# Patient Record
Sex: MALE | Race: WHITE
[De-identification: names, ages, dates, MRNs, and addresses within clinical notes are randomized per-mention and may not be internally consistent; named-entity substitution may affect disease eponyms.]

---

## 2018-06-24 ENCOUNTER — HOSPITAL ENCOUNTER (INPATIENT)
Dept: HOSPITAL 47 - EC | Age: 58
LOS: 4 days | Discharge: HOME | DRG: 493 | End: 2018-06-28
Payer: MEDICARE

## 2018-06-24 VITALS — BODY MASS INDEX: 29.1 KG/M2

## 2018-06-24 DIAGNOSIS — D72.829: ICD-10-CM

## 2018-06-24 DIAGNOSIS — K21.9: ICD-10-CM

## 2018-06-24 DIAGNOSIS — E07.81: ICD-10-CM
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
## 2018-06-24 DIAGNOSIS — S82.401A: ICD-10-CM

## 2018-06-24 DIAGNOSIS — I47.2: ICD-10-CM

## 2018-06-24 DIAGNOSIS — M19.90: ICD-10-CM
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
## 2018-06-24 DIAGNOSIS — R11.0: ICD-10-CM

## 2018-06-24 DIAGNOSIS — I35.8: ICD-10-CM

## 2018-06-24 DIAGNOSIS — I49.3: ICD-10-CM

## 2018-06-24 DIAGNOSIS — G90.512: ICD-10-CM

## 2018-06-24 DIAGNOSIS — Z96.60: ICD-10-CM

## 2018-06-24 DIAGNOSIS — K44.9: ICD-10-CM

## 2018-06-24 DIAGNOSIS — Z79.899: ICD-10-CM

## 2018-06-24 DIAGNOSIS — Z88.0: ICD-10-CM

## 2018-06-24 DIAGNOSIS — S82.201A: Primary | ICD-10-CM

## 2018-06-24 DIAGNOSIS — R00.8: ICD-10-CM

## 2018-06-24 LAB
ALBUMIN SERPL-MCNC: 4.2 G/DL (ref 3.5–5)
ALP SERPL-CCNC: 57 U/L (ref 38–126)
ALT SERPL-CCNC: 42 U/L (ref 21–72)
ANION GAP SERPL CALC-SCNC: 11 MMOL/L
AST SERPL-CCNC: 33 U/L (ref 17–59)
BASOPHILS # BLD AUTO: 0.1 K/UL (ref 0–0.2)
BASOPHILS NFR BLD AUTO: 0 %
BILIRUB INDIRECT SERPL-MCNC: 0.6 MG/DL (ref 0–1.1)
BILIRUBIN DIRECT+TOT PNL SERPL-MCNC: 0.1 MG/DL (ref 0–0.2)
BUN SERPL-SCNC: 15 MG/DL (ref 9–20)
CALCIUM SPEC-MCNC: 9.1 MG/DL (ref 8.4–10.2)
CHLORIDE SERPL-SCNC: 102 MMOL/L (ref 98–107)
CO2 SERPL-SCNC: 27 MMOL/L (ref 22–30)
EOSINOPHIL # BLD AUTO: 0.1 K/UL (ref 0–0.7)
EOSINOPHIL NFR BLD AUTO: 0 %
ERYTHROCYTE [DISTWIDTH] IN BLOOD BY AUTOMATED COUNT: 5.44 M/UL (ref 4.3–5.9)
ERYTHROCYTE [DISTWIDTH] IN BLOOD: 12.8 % (ref 11.5–15.5)
GLUCOSE SERPL-MCNC: 104 MG/DL (ref 74–99)
HCT VFR BLD AUTO: 47.4 % (ref 39–53)
HGB BLD-MCNC: 15.6 GM/DL (ref 13–17.5)
INR PPP: 1.1 (ref ?–1.2)
LYMPHOCYTES # SPEC AUTO: 1.3 K/UL (ref 1–4.8)
LYMPHOCYTES NFR SPEC AUTO: 9 %
MAGNESIUM SPEC-SCNC: 2 MG/DL (ref 1.6–2.3)
MCH RBC QN AUTO: 28.7 PG (ref 25–35)
MCHC RBC AUTO-ENTMCNC: 32.9 G/DL (ref 31–37)
MCV RBC AUTO: 87.1 FL (ref 80–100)
MONOCYTES # BLD AUTO: 0.9 K/UL (ref 0–1)
MONOCYTES NFR BLD AUTO: 6 %
NEUTROPHILS # BLD AUTO: 13 K/UL (ref 1.3–7.7)
NEUTROPHILS NFR BLD AUTO: 84 %
PH UR: 5.5 [PH] (ref 5–8)
PLATELET # BLD AUTO: 271 K/UL (ref 150–450)
POTASSIUM SERPL-SCNC: 5.1 MMOL/L (ref 3.5–5.1)
PROT SERPL-MCNC: 6.6 G/DL (ref 6.3–8.2)
PT BLD: 10.3 SEC (ref 9–12)
SODIUM SERPL-SCNC: 140 MMOL/L (ref 137–145)
SP GR UR: 1.02 (ref 1–1.03)
UROBILINOGEN UR QL STRIP: <2 MG/DL (ref ?–2)
WBC # BLD AUTO: 15.5 K/UL (ref 3.8–10.6)

## 2018-06-24 PROCEDURE — 29505 APPLICATION LONG LEG SPLINT: CPT

## 2018-06-24 PROCEDURE — 71045 X-RAY EXAM CHEST 1 VIEW: CPT

## 2018-06-24 PROCEDURE — 80048 BASIC METABOLIC PNL TOTAL CA: CPT

## 2018-06-24 PROCEDURE — 85025 COMPLETE CBC W/AUTO DIFF WBC: CPT

## 2018-06-24 PROCEDURE — 85610 PROTHROMBIN TIME: CPT

## 2018-06-24 PROCEDURE — 84439 ASSAY OF FREE THYROXINE: CPT

## 2018-06-24 PROCEDURE — 93306 TTE W/DOPPLER COMPLETE: CPT

## 2018-06-24 PROCEDURE — 84481 FREE ASSAY (FT-3): CPT

## 2018-06-24 PROCEDURE — 83735 ASSAY OF MAGNESIUM: CPT

## 2018-06-24 PROCEDURE — 80076 HEPATIC FUNCTION PANEL: CPT

## 2018-06-24 PROCEDURE — 84484 ASSAY OF TROPONIN QUANT: CPT

## 2018-06-24 PROCEDURE — 36415 COLL VENOUS BLD VENIPUNCTURE: CPT

## 2018-06-24 PROCEDURE — 96374 THER/PROPH/DIAG INJ IV PUSH: CPT

## 2018-06-24 PROCEDURE — 84443 ASSAY THYROID STIM HORMONE: CPT

## 2018-06-24 PROCEDURE — 99285 EMERGENCY DEPT VISIT HI MDM: CPT

## 2018-06-24 PROCEDURE — 96376 TX/PRO/DX INJ SAME DRUG ADON: CPT

## 2018-06-24 PROCEDURE — 81003 URINALYSIS AUTO W/O SCOPE: CPT

## 2018-06-24 RX ADMIN — HYDROCODONE BITARTRATE AND ACETAMINOPHEN PRN EACH: 5; 325 TABLET ORAL at 22:18

## 2018-06-24 RX ADMIN — HYDROCODONE BITARTRATE AND ACETAMINOPHEN PRN EACH: 5; 325 TABLET ORAL at 18:17

## 2018-06-24 RX ADMIN — HYDROMORPHONE HYDROCHLORIDE PRN MG: 1 INJECTION, SOLUTION INTRAMUSCULAR; INTRAVENOUS; SUBCUTANEOUS at 23:37

## 2018-06-24 RX ADMIN — HYDROMORPHONE HYDROCHLORIDE PRN MG: 1 INJECTION, SOLUTION INTRAMUSCULAR; INTRAVENOUS; SUBCUTANEOUS at 20:27

## 2018-06-24 RX ADMIN — METOPROLOL TARTRATE SCH MG: 25 TABLET, FILM COATED ORAL at 20:28

## 2018-06-24 RX ADMIN — HEPARIN SODIUM SCH UNIT: 5000 INJECTION, SOLUTION INTRAVENOUS; SUBCUTANEOUS at 20:28

## 2018-06-24 RX ADMIN — THERA TABS SCH EACH: TAB at 20:28

## 2018-06-24 NOTE — ED
General Adult HPI





- General


Chief complaint: Extremity Injury, Lower


Stated complaint: Fx rt leg


Time Seen by Provider: 06/24/18 11:38


Mode of arrival: EMS


Limitations: no limitations





- History of Present Illness


Initial comments: 


Patient presents for right lower leg injury.  Patient states he was helping a 

friend lift a box off a truck, when a box fell on his right lower leg.  He 

complains of pain in the medial right shin.  Patient brought in by EMS, given 

morphine prior to arrival, states he still has moderate amount of pain.  

Patient denies prior injury to the leg.  Patient states he sees orthopedic 

surgeon Dr Quintero from [facility] in past of L shoulder injury.  Patient 

denies pain or injury to any other area of the body.  Denies head trauma or 

loss of consciousness.  Pt denies numbness or weakness.








- Related Data


 Allergies











Allergy/AdvReac Type Severity Reaction Status Date / Time


 


Penicillins Allergy  Unknown Verified 06/24/18 11:41














Review of Systems


ROS Statement: 


Those systems with pertinent positive or pertinent negative responses have been 

documented in the HPI.





ROS Other: All systems not noted in ROS Statement are negative.


Constitutional: Denies: fever, chills


Eyes: Denies: eye pain


ENT: Denies: epistaxis


Respiratory: Denies: dyspnea


Cardiovascular: Denies: chest pain, palpitations


Endocrine: Denies: fatigue


Gastrointestinal: Denies: abdominal pain, nausea, vomiting


Genitourinary: Reports: other (denies flank pain)


Musculoskeletal: Reports: joint swelling, arthralgia.  Denies: back pain, 

myalgia


Skin: Denies: change in color


Neurological: Denies: weakness, numbness


Hematological/Lymphatic: Denies: easy bleeding





General Exam





- General Exam Comments


Initial Comments: 


Sitting up in bed.  No acute distress at rest.  Conversing normally.  Calm, 

pleasant.





Limitations: no limitations


General appearance: alert, in no apparent distress


Head exam: Present: atraumatic, normocephalic, normal inspection


Eye exam: Present: normal appearance, PERRL, EOMI


ENT exam: Present: normal exam, mucous membranes moist


Neck exam: Present: normal inspection.  Absent: tenderness


Respiratory exam: Present: normal lung sounds bilaterally.  Absent: respiratory 

distress, wheezes, rales


Cardiovascular Exam: Present: regular rate, normal rhythm


GI/Abdominal exam: Present: soft.  Absent: distended, tenderness, guarding, 

rebound


Extremities exam: Present: tenderness, normal capillary refill, other (

Deformity and edema mid right lower leg.  DP pulses +2 out of 4 right lower 

extremity.  Right lower shimmy neurovascularly intact.).  Absent: pedal edema


Back exam: Present: normal inspection.  Absent: tenderness


Neurological exam: Present: alert, oriented X3, other (No focal neuro deficits 

on exam.)


Psychiatric exam: Present: normal affect, normal mood


Skin exam: Present: warm, dry, intact, normal color





Course


 Vital Signs











  06/24/18 06/24/18 06/24/18





  11:38 12:06 13:06


 


Temperature 97.3 F L  


 


Pulse Rate 47 L 89 95


 


Respiratory 18 16 16





Rate   


 


Blood Pressure 133/65 147/80 137/72


 


O2 Sat by Pulse 100 98 96





Oximetry   














  06/24/18 06/24/18





  13:49 14:00


 


Temperature 98.3 F 


 


Pulse Rate 93 91


 


Respiratory 16 16





Rate  


 


Blood Pressure 140/77 134/67


 


O2 Sat by Pulse 97 97





Oximetry  














Medical Decision Making





- Medical Decision Making


Patient nothing by mouth, IV Dilaudid given for pain.





X-ray shows transverse fractures mid-diaphyseal tibia and fibula with lateral 

angulation of the distal fracture segments





13:01 Ortho surg team paged. 





13:44 Spoke with ortho team, will review xrays and call back with 

recommendations





14:20 spoke with orthopedic surgery team, request admission to Dr. Paez, plan 

for surgery tomorrow.  States he'll place preop orders.  Requests patient be 

placed in posterior mold splint, no reduction necessary at this time.  





Patient placed in a posterior mold splint, tolerated procedure well.  

Neurovascular intact following procedure.





We'll admit to inpatient this time.  Patient updated without results and plan.





Disposition


Clinical Impression: 


 Tibia/fibula fracture, shaft





Disposition: ADMITTED AS IP TO THIS HOSP


Referrals: 


Santiago Beatty MD [Primary Care Provider] - 1-2 days

## 2018-06-24 NOTE — XR
EXAMINATION TYPE: XR chest 1V portable

 

DATE OF EXAM: 6/24/2018

 

COMPARISON: NONE

 

INDICATION: Surgical clearance

 

TECHNIQUE: Single frontal view of the chest is obtained.

 

FINDINGS:  

The heart size is normal.  

The pulmonary vasculature is normal.  

The lungs are clear.  

There is elevation of the right diaphragm.

 

IMPRESSION:  

1. No acute pulmonary process.

## 2018-06-24 NOTE — P.HPOR
History of Present Illness


H&P Date: 06/24/18


Chief Complaint: Right leg pain and deformity





Patient is a very pleasant 57-year-old retired male who retired from the [employer] department in 2010.  He is generally a relatively healthy and active person.

  He says he was helping his friend move truck bed on a trailer when the bed 

slipped and he tried to move his foot around to help prevent but the large 

heavy boxes fell onto his leg and twisted his leg and caused the break.  He 

says he hurt the brake and saw the deformity at his lower leg and the rotation 

of his foot malaligned with the rest of his leg.


He denies any prior problems with his right leg.  He is normally community 

without any assistance.  He does have history of left shoulder issues and 

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 

Cymbalta.  He does have issues of his left upper extremity but is able to go 

about his normal  activities at home but is unable to work.  He was at home 

alone.  


She denies prior problems with his left lower extremity.  Denies any prior 

problems with his general ambulatory status.  He is normally independent.





Review of Systems





as per HPI.  He says he has significant pain in his right lower leg especially 

with any movement.  He denies any numbness tingl his lower extremity.  He does 

not have any pain at his neck and back.  He does have a history of fractured 

his cervical spine in the past.     He is able to flex plantar flex.  He has no 

other pains at his other leg or upper extremity or his neck or back.





Past Medical History


Past Medical History: Musculoskeletal Disorder (Left shoulder impingement, 

history of cervical spine injury stable)


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


History of Any Multi-Drug Resistant Organisms: None Reported


Past Surgical History: Joint Replacement, Orthopedic Surgery


Additional Past Surgical History / Comment(s): Neck fx repair


Past Psychological History: No Psychological Hx Reported


Smoking Status: Never smoker


Past Alcohol Use History: None Reported


Past Drug Use History: None Reported





- Past Family History


  ** Father


Family Medical History: No Reported History





  ** Mother


Family Medical History: Unable to Obtain





Medications and Allergies


 Home Medications











 Medication  Instructions  Recorded  Confirmed  Type


 


DULoxetine HCL [Cymbalta] 60 mg PO DAILY 06/24/18 06/24/18 History


 


Multivitamins, Thera [Multivitamin 1 tab PO HS 06/24/18 06/24/18 History





(formulary)]    


 


Omega-3 Fatty Acids/Fish Oil [Fish 1 cap PO HS 06/24/18 06/24/18 History





Oil 1,000 mg Softgel]    


 


Omeprazole Magnesium [PriLOSEC OTC] 20 mg PO DAILY 06/24/18 06/24/18 History











 Allergies











Allergy/AdvReac Type Severity Reaction Status Date / Time


 


Penicillins Allergy  Unknown Verified 06/24/18 14:38














Physical Examination


Osteopathic Statement: *.  No significant issues noted on an osteopathic 

structural exam other than those noted in the History and Physical/Consult.





- Ankle & Foot


  ** left


Ankle appearance: other (He is some malrotation his right foot relation to his 

knee.  There is tenderness to palpation over his mid tibia.  His compartments 

are soft.  He has sustained a new dorsal flexion plantarflexion and EHL intact.

  Though this does give him pain.  His pulses are 2 over 4distally.  His 

compartments are soft.  He is nontender over his thigh.  His left lower 

extremity full active and passive range motion.  He is in a right long leg 

splint.  His upper extremity is have good active and passive range motion.  His 

neck is nontender to palpation range motion.)


Foot appearance: other (There is no open wound laceration or bleeding.  The 

skin is intact.)





Results





- Labs


Labs: 


 Abnormal Lab Results - Last 24 Hours (Table)











  06/24/18 06/24/18 Range/Units





  15:30 15:30 


 


WBC   15.5 H  (3.8-10.6)  k/uL


 


Neutrophils #   13.0 H  (1.3-7.7)  k/uL


 


Glucose  104 H   (74-99)  mg/dL








 H & H











  06/24/18 Range/Units





  15:30 


 


Hgb  15.6  (13.0-17.5)  gm/dL


 


Hct  47.4  (39.0-53.0)  %








 Coagulation











  06/24/18 Range/Units





  15:30 


 


INR  1.1  (<1.2)  











Result Diagrams: 


 06/24/18 15:30





 06/24/18 15:30





- Diagnostic results


Knee x-ray: report reviewed, image reviewed (X-rays are reviewed of his right 

tibia and fibula.  He has a short obliquefracture at the junction of middle and 

distal third of his right tibia and fibula.  There is displacement and some 

shortening.)





Assessment and Plan


Assessment: 


 acute right tibia and fibula fracture, acute and traumatic 


 closed neurovascularly intact tibia and fibula shaft fracture 





Plan: 


 the patient has a acute traumatic closed tibia and fibula shaft fracture.  





 the fracture is neurovascularly intact.  There is displacement and shortening 

at the fracture siteAnd I think they have the best treatment for him is 

surgical intervention with intramedullary tibial rodding.  I discussed the 

nature of his injury with him.  Discussed the risk of the injury and the risk 

of the treatment options.  We discussed the risk of bleeding risk for infection 

risk and need for further surgery risk of decreased loss of motion loss of 

function malunion nonunion hardware failure nerve damage, anesthesia 

possibility of blood clots pulmonary embolism and even death was explained to 

him the patient understands his risks.  I answered his questions best my 

ability and he elected to proceed with surgical intervention.





  We'll make him nothing by mouth after midnight, obtain surgical clearance and 

plan for surgical intramedullary IM rodding of his right tibia tomorrow 

afternoon.

## 2018-06-24 NOTE — XR
EXAMINATION TYPE: XR tibia fibula RT

 

DATE OF EXAM: 6/24/2018

 

COMPARISON: NONE

 

HISTORY: Pain

 

TECHNIQUE: 2 view right tibia and fibula

 

FINDINGS: There are transverse fractures in the mid to distal diaphyseal tibia and fibula. There is l
ateral angulation of distal fracture fragments. Some bayonet overlap is evident.

 

Joint spaces as visualized appear normal.

 

IMPRESSION:

1.  Transverse fractures mid diaphyseal tibia and fibula with lateral angulation of the distal fractu
re fragments.

## 2018-06-25 LAB
BASOPHILS # BLD AUTO: 0.1 K/UL (ref 0–0.2)
BASOPHILS NFR BLD AUTO: 1 %
EOSINOPHIL # BLD AUTO: 0.2 K/UL (ref 0–0.7)
EOSINOPHIL NFR BLD AUTO: 1 %
ERYTHROCYTE [DISTWIDTH] IN BLOOD BY AUTOMATED COUNT: 5.23 M/UL (ref 4.3–5.9)
ERYTHROCYTE [DISTWIDTH] IN BLOOD: 12.9 % (ref 11.5–15.5)
HCT VFR BLD AUTO: 46.5 % (ref 39–53)
HGB BLD-MCNC: 15.1 GM/DL (ref 13–17.5)
LYMPHOCYTES # SPEC AUTO: 2.3 K/UL (ref 1–4.8)
LYMPHOCYTES NFR SPEC AUTO: 18 %
MCH RBC QN AUTO: 28.8 PG (ref 25–35)
MCHC RBC AUTO-ENTMCNC: 32.4 G/DL (ref 31–37)
MCV RBC AUTO: 88.9 FL (ref 80–100)
MONOCYTES # BLD AUTO: 0.8 K/UL (ref 0–1)
MONOCYTES NFR BLD AUTO: 6 %
NEUTROPHILS # BLD AUTO: 9 K/UL (ref 1.3–7.7)
NEUTROPHILS NFR BLD AUTO: 72 %
PLATELET # BLD AUTO: 229 K/UL (ref 150–450)
T4 FREE SERPL-MCNC: 0.98 NG/DL (ref 0.78–2.19)
WBC # BLD AUTO: 12.5 K/UL (ref 3.8–10.6)

## 2018-06-25 PROCEDURE — 0QSG06Z REPOSITION RIGHT TIBIA WITH INTRAMEDULLARY INTERNAL FIXATION DEVICE, OPEN APPROACH: ICD-10-PCS

## 2018-06-25 RX ADMIN — HYDROCODONE BITARTRATE AND ACETAMINOPHEN PRN EACH: 5; 325 TABLET ORAL at 17:03

## 2018-06-25 RX ADMIN — HYDROMORPHONE HYDROCHLORIDE PRN MG: 1 INJECTION, SOLUTION INTRAMUSCULAR; INTRAVENOUS; SUBCUTANEOUS at 05:29

## 2018-06-25 RX ADMIN — HYDROMORPHONE HYDROCHLORIDE ONE MG: 1 INJECTION, SOLUTION INTRAMUSCULAR; INTRAVENOUS; SUBCUTANEOUS at 16:06

## 2018-06-25 RX ADMIN — DULOXETINE SCH MG: 60 CAPSULE, DELAYED RELEASE ORAL at 20:31

## 2018-06-25 RX ADMIN — HEPARIN SODIUM SCH: 5000 INJECTION, SOLUTION INTRAVENOUS; SUBCUTANEOUS at 20:35

## 2018-06-25 RX ADMIN — HYDROCODONE BITARTRATE AND ACETAMINOPHEN PRN EACH: 5; 325 TABLET ORAL at 21:21

## 2018-06-25 RX ADMIN — CEFAZOLIN SCH MLS/HR: 330 INJECTION, POWDER, FOR SOLUTION INTRAMUSCULAR; INTRAVENOUS at 17:07

## 2018-06-25 RX ADMIN — HYDROMORPHONE HYDROCHLORIDE PRN MG: 1 INJECTION, SOLUTION INTRAMUSCULAR; INTRAVENOUS; SUBCUTANEOUS at 11:28

## 2018-06-25 RX ADMIN — CEFAZOLIN SCH GM: 10 INJECTION, POWDER, FOR SOLUTION INTRAVENOUS at 21:05

## 2018-06-25 RX ADMIN — HEPARIN SODIUM SCH: 5000 INJECTION, SOLUTION INTRAVENOUS; SUBCUTANEOUS at 08:24

## 2018-06-25 RX ADMIN — PANTOPRAZOLE SODIUM SCH MG: 40 TABLET, DELAYED RELEASE ORAL at 20:31

## 2018-06-25 RX ADMIN — HYDROMORPHONE HYDROCHLORIDE PRN MG: 1 INJECTION, SOLUTION INTRAMUSCULAR; INTRAVENOUS; SUBCUTANEOUS at 08:30

## 2018-06-25 RX ADMIN — HYDROMORPHONE HYDROCHLORIDE PRN MG: 1 INJECTION, SOLUTION INTRAMUSCULAR; INTRAVENOUS; SUBCUTANEOUS at 02:48

## 2018-06-25 RX ADMIN — METOPROLOL TARTRATE SCH MG: 25 TABLET, FILM COATED ORAL at 21:05

## 2018-06-25 RX ADMIN — THERA TABS SCH EACH: TAB at 21:05

## 2018-06-25 RX ADMIN — HYDROMORPHONE HYDROCHLORIDE ONE MG: 1 INJECTION, SOLUTION INTRAMUSCULAR; INTRAVENOUS; SUBCUTANEOUS at 15:59

## 2018-06-25 RX ADMIN — METOPROLOL TARTRATE SCH MG: 25 TABLET, FILM COATED ORAL at 08:31

## 2018-06-25 NOTE — ECHOF
Referral Reason:PVC, Surgical Clearance



MEASUREMENTS

--------

HEIGHT: 182.9 cm

WEIGHT: 97.5 kg

BP: 118/72

IVSd:   1.1 cm     (0.6 - 1.1)

LVIDd:   4.6 cm     (3.9 - 5.3)

LVPWd:   1.1 cm     (0.6 - 1.1)

IVSs:   1.5 cm

LVIDs:   3.5 cm

LVPWs:   1.5 cm

LAESV Index (A-L):   31.63 ml/m

Ao Diam:   4.0 cm     (2.0 - 3.7)

AV Cusp:   2.2 cm     (1.5 - 2.6)

LA Diam:   4.5 cm     (2.7 - 3.8)

EPSS:   0.6 cm

MV E Tobin:   0.50 m/s

MV DecT:   266 ms

MV A Tobin:   0.86 m/s

MV E/A Ratio:   0.58 

RAP:   5.00 mmHg

RVSP:   34.56 mmHg

MV EF SLOPE:   130.60 mm/s     (70 - 150)

MV EXCURSION:   1.76 cm     (> 18.000)







FINDINGS

--------

Sinus rhythm with extra systolic beats.

This was a technically adequate study.

The left ventricular size is normal.   There is mild concentric left ventricular hypertrophy.   Overa
ll left ventricular systolic function is low-normal with, an EF between 50 - 55 %.

The right ventricle is normal in size and function.

LA is midly dilated 29-33ml/m2.

The right atrium is normal in size.

There is mild aortic valve sclerosis.   There is no evidence of aortic regurgitation.   There is no e
vidence of aortic stenosis.

Mild mitral annular calcification present.   There is trace to mild mitral regurgitation.

Trace tricuspid regurgitation present.   Right ventricular systolic pressure is normal at < 35 mmHg. 
  There is no evidence of pulmonary hypertension.

The pulmonic valve was not well visualized.

The aortic root size is normal.

IVC Not well visulized.

There is a trivial pericardial effusion present.



CONCLUSIONS

--------

1. Sinus rhythm with extra systolic beats.

2. This was a technically adequate study.

3. The left ventricular size is normal.

4. There is mild concentric left ventricular hypertrophy.

5. Overall left ventricular systolic function is low-normal with, an EF between 50 - 55 %.

6. LA is midly dilated 29-33ml/m2.

7. There is mild aortic valve sclerosis.

8. Mild mitral annular calcification present.

9. There is trace to mild mitral regurgitation.

10. Trace tricuspid regurgitation present.

11. Right ventricular systolic pressure is normal at < 35 mmHg.

12. There is no evidence of pulmonary hypertension.

13. The pulmonic valve was not well visualized.

14. The aortic root size is normal.

15. IVC Not well visulized.

16. There is a trivial pericardial effusion present.





SONOGRAPHER: Kwadwo Moura RDCS

## 2018-06-25 NOTE — P.CRDCN
History of Present Illness


History of present illness: 





Mr. France is a pleasant 57-year-old  male with no significant past 

medical history. He denies history of coronary artery disease, hypertension, 

dyslipidemia or diabetes mellitus. He has never seen a cardiologist for any 

reason. We have been asked to see him in consultation for pre-operative 

clearance. Apparently he was helping a friend move a truck bed on a trailer and 

the bed slipped. He tried moving quickly and his right leg remained stationary 

and he felt it break. He was seen in evaluation per Dr. Paez and plan is for 

surgical intervention with intramedullary tibial rodding this afternoon. An EKG 

was obtained and he was found to be in bigeminy. Telemetry tracings reveal 

persistent bigeminy with 2 instances of non-sustained ventricular tachycardia. 

One of 3 beats and the second of 7 beats. He denies symptoms of chest pain, 

palpitations, dizziness or shortness of breath. He states he has never been 

told of having an arrhythmia. There is no old EKG on file for review.  

Laboratory data review WBC 12.5, hemoglobin 15.1, platelets 229, sodium 140, 

potassium 5.1, magnesium 2.0, creatinine 0.8, cardiac enzymes negative 2.  He 

takes no daily medications. He denies tobacco, ETOH or illicit drug use. He is 

a very active gentlemen and denies any symptoms of chest pain with exertion. 





Review of Systems





At the time of my exam:


CONSTITUTIONAL: Denies fever. Denies chills.


EYES: Denies blurred vision. Denies vision changes. Denies eye pain.


EARS, NOSE, MOUTH & THROAT: Denies headache. Denies sore throat. Denies ear 

pain.


CARDIOVASCULAR: Denies chest pain. Denies shortness of breath. Denies 

orthopnea. Denies PND. Denies palpitations.


RESPIRATORY: Denies cough. 


GASTROINTESTINAL: Denies abdominal pain. Denies diarrhea. Denies constipation. 

Denies nausea. Denies vomiting.


MUSCULOSKELETAL: Complains of right lower extremity pain.


INTEGUMENTARY: Denies pruitis. Denies rash.


NEUROLOGIC: Denies numbness. Denies tingling. Denies weakness.


PSYCHIATRIC: Denies anxiety. Denies depression.


ENDOCRINE: Denies fatigue. Denies weight change. Denies polydipsia. Denies 

polyurina.


GENITOURINARY: Denies burning, hematuria or urgency with micturation.


HEMATOLOGIC: Denies history of anemia. Denies bleeding. 








Past Medical History


Past Medical History: Musculoskeletal Disorder (Left shoulder impingement, 

history of cervical spine injury stable)


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


History of Any Multi-Drug Resistant Organisms: None Reported


Past Surgical History: Joint Replacement, Orthopedic Surgery


Additional Past Surgical History / Comment(s): Neck fx repair


Past Psychological History: No Psychological Hx Reported


Smoking Status: Never smoker


Past Alcohol Use History: None Reported


Past Drug Use History: None Reported





- Past Family History


  ** Father


Family Medical History: No Reported History





  ** Mother


Family Medical History: Unable to Obtain





Medications and Allergies


 Home Medications











 Medication  Instructions  Recorded  Confirmed  Type


 


DULoxetine HCL [Cymbalta] 60 mg PO DAILY 06/24/18 06/24/18 History


 


Multivitamins, Thera [Multivitamin 1 tab PO HS 06/24/18 06/24/18 History





(formulary)]    


 


Omega-3 Fatty Acids/Fish Oil [Fish 1 cap PO HS 06/24/18 06/24/18 History





Oil 1,000 mg Softgel]    


 


Omeprazole Magnesium [PriLOSEC OTC] 20 mg PO DAILY 06/24/18 06/24/18 History











 Allergies











Allergy/AdvReac Type Severity Reaction Status Date / Time


 


Penicillins Allergy  Unknown Verified 06/24/18 14:38














Physical Exam


Vitals: 


 Vital Signs











  Temp Pulse Pulse Resp BP BP Pulse Ox


 


 06/25/18 07:00  99.1 F   88  17   122/83  94 L


 


 06/25/18 00:35  98.4 F   60  16   118/72  94 L


 


 06/24/18 20:20  98.7 F   50 L  16   110/70  94 L


 


 06/24/18 16:26  97.8 F   49 L  16   150/87  94 L


 


 06/24/18 15:35  98.3 F  95   16  135/71   99


 


 06/24/18 14:00   91   16  134/67   97


 


 06/24/18 13:49  98.3 F  93   16  140/77   97


 


 06/24/18 13:06   95   16  137/72   96


 


 06/24/18 12:06   89   16  147/80   98


 


 06/24/18 11:38  97.3 F L  47 L   18  133/65   100








 Intake and Output











 06/24/18 06/25/18 06/25/18





 22:59 06:59 14:59


 


Intake Total  1000 


 


Balance  1000 


 


Intake:   


 


  IV  540 


 


    0.9 @ 100mls/HR  540 


 


  Oral  460 


 


Other:   


 


  # Voids 1 2 


 


  Weight 97.522 kg  














Blood pressure 122/83 heart rate 88 afebrile maintaining oxygen saturation on 

room air


GENERAL: This is a 57-year-old  male in no apparent distress at the 

time of my examination.


HEENT: Head is atraumatic, normocephalic. Pupils are equal, round. Sclerae 

anicteric. Conjunctivae are clear. Mucous membranes of the mouth are moist. 

Neck is supple. There is no jugular venous distention. No carotid bruit is 

heard.


LUNGS: Clear to auscultation no wheezes, rales or rhonchi. No chest wall 

tenderness is noted on palpation or with deep breathing.


HEART: Regular rate and rhythm without murmurs, rubs or gallops. S1 and S2 

heard.


ABDOMEN: Soft, nontender. Bowel sounds are heard. No organomegaly noted.


EXTREMITIES: Right lower extremity in immobilizer device with external rotation 

of right foot. 


VASCULAR: Radial and dorsalis pedis pulses palpated, no evidence of clubbing.  


NEUROLOGIC: Patient is awake, alert and oriented x3.


 








Results





 06/25/18 08:11





 06/24/18 15:30


 Cardiac Enzymes











  06/24/18 06/24/18 06/25/18 Range/Units





  18:34 18:34 00:41 


 


AST  33    (17-59)  U/L


 


Troponin I   <0.012  <0.012  (0.000-0.034)  ng/mL








 Coagulation











  06/24/18 Range/Units





  15:30 


 


PT  10.3  (9.0-12.0)  sec








 CBC











  06/24/18 06/25/18 Range/Units





  15:30 08:11 


 


WBC  15.5 H  12.5 H  (3.8-10.6)  k/uL


 


RBC  5.44  5.23  (4.30-5.90)  m/uL


 


Hgb  15.6  15.1  (13.0-17.5)  gm/dL


 


Hct  47.4  46.5  (39.0-53.0)  %


 


Plt Count  271  229  (150-450)  k/uL








 Comprehensive Metabolic Panel











  06/24/18 06/24/18 Range/Units





  15:30 18:34 


 


Sodium  140   (137-145)  mmol/L


 


Potassium  5.1   (3.5-5.1)  mmol/L


 


Chloride  102   ()  mmol/L


 


Carbon Dioxide  27   (22-30)  mmol/L


 


BUN  15   (9-20)  mg/dL


 


Creatinine  0.80   (0.66-1.25)  mg/dL


 


Glucose  104 H   (74-99)  mg/dL


 


Calcium  9.1   (8.4-10.2)  mg/dL


 


Unconjugated Bilirubin   0.6  (0.0-1.1)  mg/dL


 


AST   33  (17-59)  U/L


 


ALT   42  (21-72)  U/L


 


Alkaline Phosphatase   57  ()  U/L


 


Total Protein   6.6  (6.3-8.2)  g/dL


 


Albumin   4.2  (3.5-5.0)  g/dL








 Current Medications











Generic Name Dose Route Start Last Admin





  Trade Name Freq  PRN Reason Stop Dose Admin


 


Hydrocodone Bitart/Acetaminophen  1 each  06/24/18 16:40  





  Lenora 5-325  PO   





  Q4HR PRN   





  Pain 3-6   


 


Hydrocodone Bitart/Acetaminophen  2 each  06/24/18 17:03  06/24/18 22:18





  Lenora 5-325  PO   2 each





  Q4HR PRN   Administration





  Pain 7-10   


 


Duloxetine HCl  60 mg  06/25/18 09:00  06/25/18 08:24





  Cymbalta  PO   Not Given





  DAILY [facility]   


 


Heparin Sodium (Porcine)  5,000 unit  06/24/18 21:00  06/25/18 08:24





  Heparin  SQ   Not Given





  Q12HR [facility]   


 


Hydromorphone HCl  0.5 mg  06/24/18 16:43  06/24/18 17:17





  Dilaudid  IVP   0.5 mg





  Q4HR PRN   Administration





  Pain 3-6   


 


Hydromorphone HCl  1 mg  06/25/18 02:12  06/25/18 08:30





  Dilaudid  IVP   1 mg





  Q3HR PRN   Administration





  Pain 7-10   


 


Metoprolol Tartrate  12.5 mg  06/24/18 21:00  06/25/18 08:31





  Lopressor  PO   12.5 mg





  BID JOSE   Administration


 


Multivitamins  1 each  06/24/18 21:00  06/24/18 20:28





  Theragran  PO   1 each





  HS JOSE   Administration


 


Naloxone HCl  0.2 mg  06/24/18 14:25  





  Narcan  IV   





  Q2M PRN   





  Opioid Reversal   


 


Pantoprazole Sodium  40 mg  06/25/18 07:30  06/25/18 08:24





  Protonix  PO   Not Given





  AC-MARAKFST JOSE   








 Intake and Output











 06/24/18 06/25/18 06/25/18





 22:59 06:59 14:59


 


Intake Total  1000 


 


Balance  1000 


 


Intake:   


 


  IV  540 


 


    0.9 @ 100mls/HR  540 


 


  Oral  460 


 


Other:   


 


  # Voids 1 2 


 


  Weight 97.522 kg  








 





 06/25/18 08:11 





 06/24/18 15:30 











Assessment and Plan


Assessment: 





ASSESSMENT


1. Right tib/fib fracture 


2. Bigeminy 


3. Non-sustained ventricular tachycardia


4. Leukocytosis





PLAN


Obtain 2D echocardiogram and doppler study to assess cardiac structure and 

function. 


Check TSH. 


Recommend he proceed with surgery as planned with cautious fluid administration 

and optimal blood pressure control. Continue with beta blockers. 


This has been communicated to Dr. Paez per the nurse. 


He should follow up with Dr. MARA King in 3-4 weeks for outpatient stress 

testing. Plan has been communicated to the patient. 


Thank you kindly for this consultation. 








Nurse Practitioner note has been reviewed, I agree with a documented findings 

and plan of care.  Patient was seen and examined.

## 2018-06-25 NOTE — P.OP
Date of Procedure: 06/25/18


Preoperative Diagnosis: 


Acute, traumatic right tibia and fibula shaft fracture, closed and 

neurovascularly intact


Right lower extremity deformity due to fracture


Postoperative Diagnosis: 


Same


Anesthesia: GETA


Pathology: none sent


Condition: stable


Disposition: PACU


Description of Procedure: 


BRIEF OPERATIVE NOTE





Preoperative Diagnosis: Acute right tibia and fibula shaft fracture, traumatic, 

neurovascularly intact


Postoperative Diagnosis: Same


Procedure: Surgical fixation with intramedullary shameka placement at right tibia 

with close reduction of tibia fracture


   Use of fluoroscopic guidance


Surgeon: Dr. Paez


Assistant: Barry Garay is present throughout the entire the case 

persistence during positioning, dissection, exposure, visualization, and all 

crucial elements of the case as well as closure.


Anesthesia: General anesthesia


Estimated blood loss: Less than 50 mL


Tourniquet time: None


Specimen: None


Complications: None apparent


Components implanted: [place] intramedullary tibial shameka measuring 9 x 345 

millimeters with 2 screws proximally and one locking screw distally


Disposition: To recovery room in good stable condition.





OPERATIVE INDICATIONS





The patient had an acute injury yesterday when he was loading a large box with 

a friend from a [employer] and the box shifted and fell on him and causes right 

lower extremity to be contorted and injured..  He had obvious deformity at the 

area and heard a loud crack at his leg.  He is brought by ambulance to the 

emergency room after being placed in a long-leg splint.  His found have his 

neurovascular status intact but evidence of acute traumatic tibia and fibula 

shaft fracture on the right.  He does not have any other specific injuries.  He 

had obvious deformity and we were in this regard.  He had obvious displacement 

angulation and fracture.  I felt that the best treatment for him would be 

surgical fixation with intramedullary rodding of the tibia.  I discussed the 

various treatment options ranging from conservative treatment possibility of 

surgical intervention.  I discussed the risk of bleeding risk and infection 

risk and need for further surgery risk of decreased loss of motion loss 

function malunion nonunion hardware failure nerve damage as well as, occasions 

with surgery were explained.  I answered her questions best my ability and she 

elected proceed with surgical intervention.


The patient does have some cardiac history and underwent medical and cardiac 

clearance prior to his surgical procedure and was deemed to be adequate 

surgical risk for his procedure as scheduled





OPERATIVE SUMMARY





After discussing all the risks, patient alternatives and benefits at length, 

the patient elected to proceed with surgical intervention, signed informed 

consent, and presented for their procedure.  The patient was seen and examined 

in the preoperative holding area and the surgical site was marked.  The patient 

was given antibiotics and brought to the operating room.





The patient was sedated and intubated by anesthesia in standard fashion.  The 

patient was positioned on to the operating room table in a supine position with 

a pad under her right hip.  We were careful to pad any bony prominences and 

pressure points. We were careful to maintain the patient's cervical spine and 

good neutral alignment and position throughout.





We used C-arm machines to establish union fluoroscopic guidance in AP and 

lateral positions.  We were able to localize the fractures appropriately.  The 

patient was prepped and draped in a normal standard fashion.  An appropriate 

timeout and keystone protocol performed.  We were able to proceed with the 

surgery.  The local wound area was infiltrated with local anesthetic. 





An incision was made over the midline of the patellar tendon and dissection was 

taken down over the peritenon.  The tendon was split in line with its fibers 

near its midpoint taxis anterior superior aspect of the proximal tibia on the 

right.  A guidepin was utilized to establish the starting point and positioning 

was established with a fluoroscopic guidance to get good alignment and position 

and the anterior superior aspect of the proximal tibia.  I was able to drill 

down into the proximal tibia and good alignment and good position.  I was then 

able to overdrill protecting soft tissue structures and the patellar tendon 

with a tissue protector.  With the starting hole establish was then able to 

place a guide shameka down into the tibial shaft.  We're able to get good alignment 

and good position of the fracture with closed reduction techniques and the 

guide was passed across the fracture site into the distal tibia at the midpoint 

of the distal tibia near the center of the ankle joint without penetrating into 

the ankle joint itself.


With the guide in tact the soft tissue protectors establish and we able to ream 

over the guidewire across fracture site and into the distal tibia being careful 

to maintain the starting position and the fracture in good alignment and good 

position we did sequential reaming up to a 10-1/2 mm reamer.  We good chatter 

at the isthmus.  I felt that we could use a 9 mm shameka for this.  We measured 

appropriately and chose a 9 x 345 mm [employer] tibial shameka.  Holding the fracture 

in good alignment and position the shameka was inserted over the guidewire across 

fracture site and into the distal tibia and good alignment good position with 

the tip at the junction of the metaphyseal to axial border.  The shameka was sunk 

appropriately.  The guidepin was removed.  We able to have excellent alignment 

position at the fracture site with shameka intact.  The proximal jig was used to 

establish 2 oblique screws anteromedially and anterolaterally at the proximal 

locking holes.  Skin holes were established drilling was accomplished and we 

placed 2 screws in good alignment good position with excellent bony purchase 

through the proximal locking holes at the proximal tibia.  With these intact I 

was then able to remove the proximal jig.  The shameka was countersunk and I 

decided place a 10 mm At the proximal tibia each shameka which was placed in good 

alignment and good position and tightened appropriately.


The wound was copiously irrigated and suctioned dry the fracture site showed 

excellent position with excellent bony apposition.  I decided place a distal 

locking screw.  Then using a freehand technique from medial to lateral position 

as able use C-arm guidance in a freehand technique to establish drill hole 

across the proximal most distal locking hole.  The screw was drilled measured 

and I placed a 35 mm screw and good alignment good position with excellent bony 

purchase at the distal locking hole.  The fracture in excellent alignment 

position near anatomic alignment and position.  Final images were taken showing 

excellent alignment and position of the shameka and fracture.


The screws were irrigated and suctioned dry.  





We were able to proceed with closure.   The peritenon was closed with 3-0 

Vicryl subcu stitch was closed with 2-0 Vicryl and the skin was closed with 3-0 

Monocryl.  The skin wounds are clean and dried with stress mass all Steri-

Strips Telfa 4 x 4's ABDs and Ace wrap. The drapes were broken down.  The 

patient was gently rolled back onto their hospital bed being careful to 

maintain their cervical spine and good neutral alignment and position.  They 

were woken up by anesthesia, extubated, and brought to the recovery room in 

good stable condition.  He will be able to do gentle range of motion at his 

knee ankle foot and toes but should remain nonweightbearing on his right lower 

extremity





The patient will be  able to be discharged from the hospital after appropriate 

observation due to  and for appropriate postoperative care, medical management 

and monitoring hopefully in the morning.  We will continue to follow them 

closely about the postoperative course. a plan see her back in the office in 

approximately 1 week's time or sooner if she is having problems.

## 2018-06-25 NOTE — FL
Fluoroscopy

 

HISTORY: Fracture

 

1 minute 13 seconds fluoroscopy time supplied to the referring clinician.  6 intraoperative C-arm yara
ges document the procedure. See dictated report from orthopedic surgery.

## 2018-06-25 NOTE — P.PN
Progress Note - Text


Progress Note Date: 06/25/18





Patient is a very pleasant 57-year-old male who is seen and examined at the 

bedside for further evaluation for his known right mid tibia and fibula mid 

shaft fractures after a box fell on his leg yesterday, 06/24/2018, from the 

back of a truck.  Patient is currently nonweightbearing on the right lower 

extremity with a immobilizer intact.  He is nothing by mouth status in 

anticipation for surgical intervention today, 06/25/2018.  Patient continues to 

have pain in the right lower extremity.  He does have adequate sensation and is 

neurovascularly intact in the right lower extremity.  He is not currently 

complaining of any other symptoms.  He was having difficulty with pain control 

throughout the night.  Dilaudid 1 mg was increased to every 3 hours which has 

provided some better relief of his symptoms.  He is ready to proceed forward 

with surgical intervention.  He is waiting for clearance by medicine.  Per 

nursing, medicine is planning for the patient to need clearance by cardiology.  

Medicine's dictation states patient has a noted history of ventricular 

bigeminy.  Patient states he is unsure why he needs further cardiac workup as 

he denies any significant cardiac history.  Nursing will plan to follow up with 

medicine to determine if cardiac workup is necessary.








Physical Exam:





Patient is awake, alert, and oriented 3


Vital signs stable


Good chest excursion with deep inspiration and expiration


No signs or symptoms of DVT; no calf pain


Right lower extremity is externally rotated and shortened


Significant pain with palpation over the right tibia and fibula


No evidence of significant erythema or bruising over the right lower extremity


Neurovascularly intact right lower extremity


Immobilizer intact right lower extremity


Patient is able to wiggle toes of the right lower extremities significant 

difficulty








Assessment:


Traumatic acute right tibia and fibula shaft fracture


Pain right lower extremity








Plan:


1.  Patient is currently scheduled to undergo ORIF of the right tibia with 

intramedullary nail fixation this afternoon, 06/25/2018.  Patient is currently 

nothing by mouth status.  Patient is ready to proceed forward with surgical 

intervention.  Surgical intervention has been discussed in detail by Dr. Yimi Paez and myself.  Risks have been discussed in detail by Dr. Yimi Paez.  

Patient is currently waiting for clearance by medicine and cardiology.  He 

continues to be nonweightbearing on the right lower extremity.  He may continue 

to receive IV Dilaudid as prescribed as needed for relief and control of his 

pain.


2.  Medicine will continue to follow patient closely and must approve surgical 

clearance prior to scheduled surgical intervention today


3.  Patient waiting for consultation and clearance by cardiology


4.  We will continue follow patient closely

## 2018-06-25 NOTE — PN
PROGRESS NOTE



DATE OF SERVICE:

06/25/2018



This 57-year-old gentleman who was admitted after right tibia-fibular fracture

underwent surgical fixation with intramedullary placement of the right tibia and closed

reduction of the tibia fracture using a fluoroscopic guidance.  The patient tolerated

the procedure. Patient had PVCs prior to surgery. Currently the patient appears in

normal sinus rhythm. The patient was evaluated by Cardiology as well.  A 2D echo with

Doppler was done this morning, which showed ejection fraction 50-55% and no other

significant issues.  The patient is slightly sedated time.



PHYSICAL EXAM:

Alert and oriented x3. Pulse 101, blood pressure 157/80, respirations 16, temperature

is normal, pulse ox 100% on 10 L.

HEENT: Conjunctivae normal. Oral mucosa moist. Neck is no jugular venous distention. No

carotid bruit. No lymph node enlargement.

CARDIOVASCULAR: S1, S2.

RESPIRATORY:  Breath sounds diminished in the bases. No rhonchi, no crackles.

ABDOMEN:  Soft, nontender.

LEGS: Status post surgery.

NERVOUS SYSTEM:  No focal deficits.



LABS:

TSH 6.80, free T4 is 0.98, which is normal.  Free T3 is 3.582, which is also normal. UA

noted.



ASSESSMENT:

1. Status post right tibia-fibula fracture and intramedullary shameka placement of the

    right tibia.

2. PVCs and ventricular bigeminy.

3. Increased WBC.

4. History of RSD of the left arm.

5. Sick euthyroid.



RECOMMENDATIONS AND DISCUSSION:

This 57-year-old gentleman who presented with multiple complex medical issues, I would

recommend to continue current management and symptomatic treatment.  Continue with beta

blockers and continue the rest of the medications.  Closely follow with Orthopedic

Surgery and Cardiology.  Further recommendations to follow.





MMODL / IJN: [id number] / Job#: [id number]

## 2018-06-26 LAB
BASOPHILS # BLD AUTO: 0.1 K/UL (ref 0–0.2)
BASOPHILS NFR BLD AUTO: 0 %
EOSINOPHIL # BLD AUTO: 0.1 K/UL (ref 0–0.7)
EOSINOPHIL NFR BLD AUTO: 1 %
ERYTHROCYTE [DISTWIDTH] IN BLOOD BY AUTOMATED COUNT: 4.45 M/UL (ref 4.3–5.9)
ERYTHROCYTE [DISTWIDTH] IN BLOOD: 13.2 % (ref 11.5–15.5)
HCT VFR BLD AUTO: 39.8 % (ref 39–53)
HGB BLD-MCNC: 12.9 GM/DL (ref 13–17.5)
LYMPHOCYTES # SPEC AUTO: 1.3 K/UL (ref 1–4.8)
LYMPHOCYTES NFR SPEC AUTO: 11 %
MCH RBC QN AUTO: 29.1 PG (ref 25–35)
MCHC RBC AUTO-ENTMCNC: 32.6 G/DL (ref 31–37)
MCV RBC AUTO: 89.3 FL (ref 80–100)
MONOCYTES # BLD AUTO: 0.7 K/UL (ref 0–1)
MONOCYTES NFR BLD AUTO: 6 %
NEUTROPHILS # BLD AUTO: 9.4 K/UL (ref 1.3–7.7)
NEUTROPHILS NFR BLD AUTO: 80 %
PLATELET # BLD AUTO: 214 K/UL (ref 150–450)
WBC # BLD AUTO: 11.8 K/UL (ref 3.8–10.6)

## 2018-06-26 RX ADMIN — HEPARIN SODIUM SCH UNIT: 5000 INJECTION, SOLUTION INTRAVENOUS; SUBCUTANEOUS at 07:47

## 2018-06-26 RX ADMIN — CEFAZOLIN SCH GM: 10 INJECTION, POWDER, FOR SOLUTION INTRAVENOUS at 04:56

## 2018-06-26 RX ADMIN — HYDROCODONE BITARTRATE AND ACETAMINOPHEN PRN EACH: 5; 325 TABLET ORAL at 15:57

## 2018-06-26 RX ADMIN — CEFAZOLIN SCH MLS/HR: 330 INJECTION, POWDER, FOR SOLUTION INTRAMUSCULAR; INTRAVENOUS at 04:46

## 2018-06-26 RX ADMIN — METOPROLOL TARTRATE SCH MG: 25 TABLET, FILM COATED ORAL at 07:47

## 2018-06-26 RX ADMIN — THERA TABS SCH EACH: TAB at 20:34

## 2018-06-26 RX ADMIN — HEPARIN SODIUM SCH UNIT: 5000 INJECTION, SOLUTION INTRAVENOUS; SUBCUTANEOUS at 20:34

## 2018-06-26 RX ADMIN — ONDANSETRON PRN MG: 2 INJECTION INTRAMUSCULAR; INTRAVENOUS at 05:06

## 2018-06-26 RX ADMIN — ONDANSETRON PRN MG: 2 INJECTION INTRAMUSCULAR; INTRAVENOUS at 00:05

## 2018-06-26 RX ADMIN — HYDROCODONE BITARTRATE AND ACETAMINOPHEN PRN EACH: 5; 325 TABLET ORAL at 11:52

## 2018-06-26 RX ADMIN — PANTOPRAZOLE SODIUM SCH MG: 40 TABLET, DELAYED RELEASE ORAL at 07:47

## 2018-06-26 RX ADMIN — METOPROLOL TARTRATE SCH MG: 25 TABLET, FILM COATED ORAL at 20:34

## 2018-06-26 RX ADMIN — HYDROCODONE BITARTRATE AND ACETAMINOPHEN PRN EACH: 5; 325 TABLET ORAL at 20:49

## 2018-06-26 RX ADMIN — CEFAZOLIN SCH: 330 INJECTION, POWDER, FOR SOLUTION INTRAMUSCULAR; INTRAVENOUS at 14:54

## 2018-06-26 RX ADMIN — ONDANSETRON PRN MG: 2 INJECTION INTRAMUSCULAR; INTRAVENOUS at 11:52

## 2018-06-26 RX ADMIN — DOCUSATE SODIUM AND SENNOSIDES SCH EACH: 50; 8.6 TABLET ORAL at 07:47

## 2018-06-26 RX ADMIN — ONDANSETRON PRN MG: 2 INJECTION INTRAMUSCULAR; INTRAVENOUS at 17:01

## 2018-06-26 RX ADMIN — DULOXETINE SCH MG: 60 CAPSULE, DELAYED RELEASE ORAL at 07:47

## 2018-06-26 RX ADMIN — HYDROCODONE BITARTRATE AND ACETAMINOPHEN PRN EACH: 5; 325 TABLET ORAL at 03:54

## 2018-06-26 NOTE — P.PN
Subjective





Mr. France is a pleasant 57-year-old  male with no significant past 

medical history. He denies history of coronary artery disease, hypertension, 

dyslipidemia or diabetes mellitus. He has never seen a cardiologist for any 

reason. We have been asked to see him in consultation for pre-operative 

clearance. Apparently he was helping a friend move a truck bed on a trailer and 

the bed slipped. He tried moving quickly and his right leg remained stationary 

and he felt it break. He was seen in evaluation per Dr. Paez and plan is for 

surgical intervention with intramedullary tibial rodding this afternoon. An EKG 

was obtained and he was found to be in bigeminy. Telemetry tracings reveal 

persistent bigeminy with 2 instances of non-sustained ventricular tachycardia. 

One of 3 beats and the second of 7 beats. He denies symptoms of chest pain, 

palpitations, dizziness or shortness of breath. He states he has never been 

told of having an arrhythmia. There is no old EKG on file for review.  

Laboratory data review WBC 12.5, hemoglobin 15.1, platelets 229, sodium 140, 

potassium 5.1, magnesium 2.0, creatinine 0.8, cardiac enzymes negative 2.  He 

takes no daily medications. He denies tobacco, ETOH or illicit drug use. He is 

a very active gentlemen and denies any symptoms of chest pain with exertion. 





6/26/2018


Mr. France is seen and examined sitting up in bed. He underwent surgical 

fixation with intramedullary shameka placement of the right tibia with closed 

reduction of tibia fracture with Dr. Paez. Telemetry tracings continue to show 

frequent PVCs with bigeminy. No further episodes of non-sustained VT noted. He 

complains of feeling very nauseated throughout the night with symptoms of pain 

in the right leg and hip. He has been receiving dilaudid, norco and zofran. He 

denies symptoms of chest pain, shortness of breath, palpitations, dizziness or 

diaphoresis. Blood pressure 110/70 heart rate 87. WBC 11.8, hgb 12.9 down from 

15.1 on admission, TSH 6.18, free T4 0.98. Echocardiogram reveals low-normal LV 

systolic function with EF 50-55% and mild aortic valve sclerosis.  





Objective





- Vital Signs


Vital signs: 


 Vital Signs











Temp  98.4 F   06/26/18 07:30


 


Pulse  87   06/26/18 07:30


 


Resp  17   06/26/18 07:30


 


BP  110/70   06/26/18 07:30


 


Pulse Ox  97   06/26/18 07:30








 Intake & Output











 06/25/18 06/26/18 06/26/18





 18:59 06:59 18:59


 


Intake Total 1150  


 


Output Total 150 700 


 


Balance 1000 -700 


 


Intake:   


 


  IV 1150  


 


Output:   


 


  Urine 100 700 


 


  Estimated Blood Loss 50  


 


Other:   


 


  # Voids 3  














- Exam





GENERAL: Well-appearing, well-nourished and in no acute distress.


NECK: Supple without JVD or thyromegaly.


LUNGS: Breath sounds clear to auscultation bilaterally. Respiration equal and 

unlabored.  No wheezes, rales or rhonchi.


HEART: Regular rate and rhythm without murmurs, rubs or gallops. S1 and S2 

heard.


EXTREMITIES: Normal range of motion, no edema.  No clubbing or cyanosis. 

Peripheral pulses intact and strong.





- Labs


CBC & Chem 7: 


 06/26/18 06:47





 06/24/18 15:30


Labs: 


 Abnormal Lab Results - Last 24 Hours (Table)











  06/25/18 06/26/18 Range/Units





  08:11 06:47 


 


WBC   11.8 H  (3.8-10.6)  k/uL


 


Hgb   12.9 L  (13.0-17.5)  gm/dL


 


Neutrophils #   9.4 H  (1.3-7.7)  k/uL


 


TSH  6.180 H   (0.465-4.680)  mIU/L














Assessment and Plan


Assessment: 





ASSESSMENT


1. Right tib/fib fracture 


2. Bigeminy 


3. Non-sustained ventricular tachycardia


4. Leukocytosis





PLAN


Stable from a cardiac perspective. 


Continue with beta blocker 12.5 mg BID. 


Follow up with Dr. MARA King upon discharge. 


We will follow as needed, please feel free to call with questions or concerns. 








Nurse Practitioner note has been reviewed, I agree with a documented findings 

and plan of care.  Patient was seen and examined.

## 2018-06-26 NOTE — PN
PROGRESS NOTE



DATE OF SERVICE:

06/26/2018.



This 57-year-old gentleman who was admitted after right tibia-fibular fracture had

intramedullary shameka placement.  No chest pain.  No palpitations.  No fever.  Patient

complaining of severe pain.



EXAM:

Alert, oriented x3. Pulse 87, blood pressure 110/70, respiration 17, temperature 98.4,

pulse ox 97% on room air.

HEENT:  Conjunctivae normal.

NECK: No jugular venous distention.

CARDIOVASCULAR:  S1, S2.

RESPIRATORY: Breath sounds diminished in the bases. No rhonchi, no crackles.

ABDOMEN: Soft, nontender.

LEGS: Status post surgery.

NERVOUS SYSTEM: No focal deficits.



LABS:

WBC 11.8. Other labs are noted.



ASSESSMENT:

1. Status post right tibia-fibula fracture, status post intramedullary shameka placement

    of the right tibia.

2. PVCs and ventricular bigeminy, improved.

3. Increased WBC.

4. History RSD of the left arm.

5. Sick euthyroid state.



RECOMMENDATIONS AND DISCUSSION:

I recommend to continue current management and symptomatic treatment. Otherwise

ventricular bigeminy appears to be subsided at this time.  We will continue to monitor

closely with Cardiology.  Cardiac workup is negative so far. Further recommendations to

follow. Recommend close outpatient follow up.



MMODL / IJN: [id number] / Job#: [id number]

## 2018-06-26 NOTE — P.PN
Progress Note - Text


Progress Note Date: 06/26/18





Postoperative day #1





Patient is seen and examined today at bedside.  The patient has some pain 

around the surgical site as expected.  Pain is being controlled with medication.


He has not yet been out of bed no like to see him get out of bed with 

assistance.  He is able to move his ankle foot and toes but he has significant 

soreness at his lower leg as expected.  He is having difficulty voiding.  He is 

also having difficulty holding foods.


Physical Exam





Afebrile with stable vital signs


Abdomen is soft nontender.  Chest has good excursion deep and space expiration


The incision site is clean dry and intact.  No erythema there is no purulence.


Extremities have not had neurologic change from prior to surgery.  His calf and 

thigh are soft.  He has sustained dorsal flexion or flexion and EHL intact.  

The incisions seem to be healing appropriately without drainage. 


Calves and thighs were soft nontender without evidence of DVT.





Assessment/Plan





Postoperative day #1 status post intramedullary rodding of his right tibia for 

his acute tibia and fibula fracture


He appears to be healing appropriately in terms of his leg





Patient is progressing as expected from the surgery. He is having difficulty 

with voiding and I see how he does sitting up on a toilet or standing to try to 

urinate as I think that will help alleviate his bladder constriction.  If he is 

not able to void in the next several hours he may need straight catheterization 





We will continue to increase the patient's mobilization with therapy.   I would 

like therapy to get him out of bed today maintaining nonweightbearing status on 

left lower extremity 





I do not think the patient is ready to go home yet today.  He is not tolerating 

his oral diet and he has not yet voided.  He has not yet been out of bed to 

[facility] house a few is with his mobility in all of these things need to be 

accomplished before he can be home by himself.  Hopefully he will be able to 

make good progress today and plan for discharge home tomorrow.  


We will continue pain control with oral or IV medications.  We'll continue to 

follow patient closely.

## 2018-06-27 RX ADMIN — METOPROLOL TARTRATE SCH: 25 TABLET, FILM COATED ORAL at 10:46

## 2018-06-27 RX ADMIN — DOCUSATE SODIUM AND SENNOSIDES SCH: 50; 8.6 TABLET ORAL at 10:46

## 2018-06-27 RX ADMIN — HEPARIN SODIUM SCH: 5000 INJECTION, SOLUTION INTRAVENOUS; SUBCUTANEOUS at 10:46

## 2018-06-27 RX ADMIN — METOPROLOL TARTRATE SCH MG: 25 TABLET, FILM COATED ORAL at 21:54

## 2018-06-27 RX ADMIN — CEFAZOLIN SCH: 330 INJECTION, POWDER, FOR SOLUTION INTRAMUSCULAR; INTRAVENOUS at 21:20

## 2018-06-27 RX ADMIN — HEPARIN SODIUM SCH UNIT: 5000 INJECTION, SOLUTION INTRAVENOUS; SUBCUTANEOUS at 21:54

## 2018-06-27 RX ADMIN — CEFAZOLIN SCH: 330 INJECTION, POWDER, FOR SOLUTION INTRAMUSCULAR; INTRAVENOUS at 10:56

## 2018-06-27 RX ADMIN — PANTOPRAZOLE SODIUM SCH: 40 TABLET, DELAYED RELEASE ORAL at 10:46

## 2018-06-27 RX ADMIN — HYDROCODONE BITARTRATE AND ACETAMINOPHEN PRN EACH: 7.5; 325 TABLET ORAL at 19:23

## 2018-06-27 RX ADMIN — THERA TABS SCH EACH: TAB at 21:54

## 2018-06-27 RX ADMIN — DULOXETINE SCH: 60 CAPSULE, DELAYED RELEASE ORAL at 10:46

## 2018-06-27 NOTE — P.PN
Progress Note - Text


Progress Note Date: 06/27/18





Patient is very pleasant 57-year-old male who is seen and examined at bedside 

for follow-up evaluation after undergoing intramedullary rodding of the right 

tibia following acute tibia and fibula fracture.  Patient continues to have 

significant pain in his right lower extremity.  He is having ongoing nausea and 

vomiting as well.  He is urinating without difficulty.  He has not had a bowel 

movement postsurgically.  He states he is passing lots of gas and feels like he 

needs to have a bowel movement but has been unable to do so.  He has been 

receiving Senokot and milk of magnesia.  He is eating without difficulty other 

than his nausea.  He has been working with physical therapy to increase 

mobility and ambulation.  He has been receiving heparin 5000 units subcu every 

12 hours per medicine for anticoagulation.








Physical Exam:





Patient is awake, alert, and oriented 3


Vital signs stable


Good chest excursion with deep inspiration and expiration


Abdomen soft nontender


No signs or symptoms of DVT; no calf pain


Extensor hallucis longus, plantarflexion, and dorsiflexion positive sustained 

bilateral lower extremities


Vascular intact right lower extremity


Pain with palpation over the right lower extremity at the fracture site.


Incisions at the inferior patella, proximal tibia and distal tibia are dry and 

intact with no active drainage and no obvious sign of infection


No evidence of significant erythema or bruising around the surgical sites


Mild swelling of the right lower extremity at the fracture sites was some mild 

bruising








Assessment:


Status post day #2 ORIF right intramedullary nail fixation of the right tibia


Right fibula fracture


Acute right lower extremity pain


Postsurgical Nausea








Plan:


1.  Patient has had some progress postsurgically as he is voiding without 

difficulty and has been working with physical therapy to increase mobility and 

ambulation.  He has continued to have significant pain in the right lower 

extremity at his fracture sites following surgical intervention.  He also 

continues to have significant nausea.  He has been receiving Norco 5 mg/325 mg 

and Dilaudid for pain control.  We will plan to increase to Norco 7.5 mg/325 mg 

and continue with Dilaudid as prescribed as needed for relief of his pain.  We 

will discontinue Norco 5 mg/325 mg.  He has been receiving Zofran IV for 

nausea.  We will plan to add Vistaril 25 mg 3 times a day.  He will continue to 

work with physical therapy to increase mobility and ambulation.  If his pain is 

better controlled, he is able to have a bowel movement, and his nausea is 

better controlled.  We'll plan for discharge home tomorrow, 06/28/2018.  He 

will continue to be nonweightbearing on the right lower extremity.  We 

discussed he should keep his incisions clean, dry, and intact.  He may continue 

with daily dressing changes as needed.  He may continue to use crutches to aid 

in ambulation.  We will plan to discharge patient on aspirin 325 mg twice a day

, dispensed #60 at discharge.


2.  Patient will continue be followed by medicine; medicine to continue with 

anticoagulation with heparin 5000 units every 12 hours


3.  We'll continue to follow patient closely with plans for discharge home as 

early as tomorrow, 06/28/2018


4.  Following discharge, patient may follow-up with Barry Penny PA-C or Dr. Yimi Paez at [facility] in 1 week for further 

evaluation


5.  Patient has been discussed in detail with Dr. Yimi Paez and he agrees with 

this plan.

## 2018-06-27 NOTE — PN
PROGRESS NOTE



DATE OF SERVICE:

06/27/2018



This 57-year-old gentleman was admitted with right tibia-fibular fracture of the right

extremity, also had intramedullary shameka insertion.  No chest pain.  No palpitations.  No

fever.



PHYSICAL EXAM:

Alert and oriented x3. Pulse 93, blood pressure 106/69, respirations 16, temperature

98.4, pulse ox 91% on 3 L.

HEENT: Conjunctivae normal.

NECK:  No jugular venous distension.

CARDIOVASCULAR SYSTEMS:  S1, S2, muffled.

RESPIRATORY:  Breath sounds diminished at the bases, a few scattered rhonchi, no

crackles.

ABDOMEN:  Soft, nontender.  No mass palpable.

LEGS:  Status post surgery.

NERVOUS SYSTEM:  No focal deficits.



LABS:

WBC 11.8, hemoglobin is 12.9.



ASSESSMENT:

1. Status post right tibia-fibula fracture and intramedullary shameka placement of the

    right tibia.

2. Premature ventricular contractions and ventricular bigeminy, improved.

3. Increased WBC.

4. 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

5. Sick euthyroid state.



RECOMMENDATION:

Recommend to continue current management and symptomatic treatment. Recommend close

follow up with primary physician in the outpatient setting and follow up with

Cardiology.  The patient is in normal sinus rhythm at this time.

Further recommendations to follow.





MMODL / IJN: [id number] / Job#: [id number]

## 2018-06-28 VITALS — RESPIRATION RATE: 16 BRPM | DIASTOLIC BLOOD PRESSURE: 76 MMHG | SYSTOLIC BLOOD PRESSURE: 114 MMHG | HEART RATE: 95 BPM

## 2018-06-28 VITALS — TEMPERATURE: 98.6 F

## 2018-06-28 LAB
BASOPHILS # BLD AUTO: 0.1 K/UL (ref 0–0.2)
BASOPHILS NFR BLD AUTO: 1 %
EOSINOPHIL # BLD AUTO: 0.4 K/UL (ref 0–0.7)
EOSINOPHIL NFR BLD AUTO: 4 %
ERYTHROCYTE [DISTWIDTH] IN BLOOD BY AUTOMATED COUNT: 4.42 M/UL (ref 4.3–5.9)
ERYTHROCYTE [DISTWIDTH] IN BLOOD: 13.4 % (ref 11.5–15.5)
HCT VFR BLD AUTO: 39.2 % (ref 39–53)
HGB BLD-MCNC: 12.8 GM/DL (ref 13–17.5)
LYMPHOCYTES # SPEC AUTO: 1.8 K/UL (ref 1–4.8)
LYMPHOCYTES NFR SPEC AUTO: 23 %
MCH RBC QN AUTO: 29 PG (ref 25–35)
MCHC RBC AUTO-ENTMCNC: 32.8 G/DL (ref 31–37)
MCV RBC AUTO: 88.5 FL (ref 80–100)
MONOCYTES # BLD AUTO: 0.5 K/UL (ref 0–1)
MONOCYTES NFR BLD AUTO: 6 %
NEUTROPHILS # BLD AUTO: 5 K/UL (ref 1.3–7.7)
NEUTROPHILS NFR BLD AUTO: 63 %
PLATELET # BLD AUTO: 235 K/UL (ref 150–450)
WBC # BLD AUTO: 8 K/UL (ref 3.8–10.6)

## 2018-06-28 RX ADMIN — HYDROCODONE BITARTRATE AND ACETAMINOPHEN PRN EACH: 7.5; 325 TABLET ORAL at 00:27

## 2018-06-28 RX ADMIN — DULOXETINE SCH MG: 60 CAPSULE, DELAYED RELEASE ORAL at 08:43

## 2018-06-28 RX ADMIN — METOPROLOL TARTRATE SCH MG: 25 TABLET, FILM COATED ORAL at 08:43

## 2018-06-28 RX ADMIN — CEFAZOLIN SCH: 330 INJECTION, POWDER, FOR SOLUTION INTRAMUSCULAR; INTRAVENOUS at 08:58

## 2018-06-28 RX ADMIN — HYDROCODONE BITARTRATE AND ACETAMINOPHEN PRN EACH: 7.5; 325 TABLET ORAL at 13:10

## 2018-06-28 RX ADMIN — PANTOPRAZOLE SODIUM SCH MG: 40 TABLET, DELAYED RELEASE ORAL at 08:43

## 2018-06-28 RX ADMIN — HYDROCODONE BITARTRATE AND ACETAMINOPHEN PRN EACH: 7.5; 325 TABLET ORAL at 05:59

## 2018-06-28 RX ADMIN — HEPARIN SODIUM SCH UNIT: 5000 INJECTION, SOLUTION INTRAVENOUS; SUBCUTANEOUS at 08:43

## 2018-06-28 RX ADMIN — DOCUSATE SODIUM AND SENNOSIDES SCH EACH: 50; 8.6 TABLET ORAL at 08:56

## 2018-06-28 NOTE — P.DS
Providers


Date of admission: 


06/25/18 08:57





Attending physician: 


BRENDA Paez





Consults: 





 





06/24/18 14:27


Consult Physician Routine 


   Consulting Provider: Azael Ramirez Reason/Comments: medical managment


   Do you want consulting provider notified?: Yes





06/24/18 17:45


Consult Physician Routine 


   Consulting Provider: Rafael Aviles Reason/Comments: Surgical Clearance


   Do you want consulting provider notified?: Yes, Notify in am











[facility] 


[facility]





[facility] Course: 





The patient presented on the day of admission as per his operative note.  He 

has sustained a traumatic injury where a box fell on his leg while he was 

helping his friend load something from a forklift.  He sustained an acute 

traumatic close neurovascular intact right tibia and fibula shaft fracture 

which required operative intervention.  His surgery went well as per his 

operative note.  He is making progress postoperative.  His pain is being 

controlled appropriately.  He is doing better with his motion and an ablation.  

He has had a bowel movement and he is tolerating his regular diet.  He is 

tolerating pain adequately with oral medications.





Physical Exam





The incision site is clean dry and intact.  There is no erythema no drainage.  

There is no purulence no evidence of infection.  The compartments are soft.  

There is no evidence of any compartment syndrome.





Abdomen soft and nontender.





Chest has good excursion with deep inspiration and expiration.





The patient has active and passive range of motion intact at the upper and 

lower extremities.  There is no acute change in neurologic status.  He has 

sustained dorsal flexion and extensor hallucis longus knee flexion and 

extension and hip flexion and extension intact.





Hospital Course





Postoperative day #3 status post surgical intramedullary rodding of right tibia 

shaft fracture due to traumatic tibia and fibula for fracture.  





The patient has been making good progress postoperatively.  They have completed 

the prophylactic antibiotics without any signs or symptoms of infection.  The 

patient has been able to advance their diet, and is tolerating diet adequately.

  The pain was initially controlled with IV medications and is now controlled 

appropriately with oral medications.  The patient has been able to increase 

their mobilization.  He should remain nonweightbearing on his right lower 

extremity.  He is using crutches or walker as he is able.


He has been able to tolerate his regular diet as well as pain with oral 

medications.  He is mobilizing better and I think he'll be safe on his own 

today.


I would like him to continue aspirin at home for DVT prophylaxis.  He has been 

on heparin here in Hospital.  We will give her prescription for aspirin as well 

as for Norco for his pain.


The patient has progressed appropriately.  I think they are in good stable 

condition for discharge today.  They will be sent home with appropriate 

prescriptions.  I answered their questions to the best of my ability in a 

language that they can understand and they are agreeable with the plan.  They 

will follow up as directed in approximately 2 weeks or sooner if he is having 

problems.


Patient Condition at Discharge: Fair





Plan - Discharge Summary


Discharge Rx Participant: Yes


New Discharge Prescriptions: 


New


   HYDROcodone/APAP 7.5-325MG [Norco 7.5-325] 1 tab PO Q4-6H PRN #42 tab


     PRN Reason: Pain


   Aspirin 325 mg PO BID #60 tab





No Action


   Omega-3 Fatty Acids/Fish Oil [Fish Oil 1,000 mg Softgel] 1 cap PO HS


   Multivitamins, Thera [Multivitamin (formulary)] 1 tab PO HS


   DULoxetine HCL [Cymbalta] 60 mg PO DAILY


   Omeprazole Magnesium [PriLOSEC OTC] 20 mg PO DAILY


Discharge Medication List





DULoxetine HCL [Cymbalta] 60 mg PO DAILY 06/24/18 [History]


Multivitamins, Thera [Multivitamin (formulary)] 1 tab PO HS 06/24/18 [History]


Omega-3 Fatty Acids/Fish Oil [Fish Oil 1,000 mg Softgel] 1 cap PO HS 06/24/18 [

History]


Omeprazole Magnesium [PriLOSEC OTC] 20 mg PO DAILY 06/24/18 [History]


Aspirin 325 mg PO BID #60 tab 06/28/18 [Rx]


HYDROcodone/APAP 7.5-325MG [Norco 7.5-325] 1 tab PO Q4-6H PRN #42 tab 06/28/18 [

Rx]








Follow up Appointment(s)/Referral(s): 


Kristina King MD [STAFF PHYSICIAN] - 07/24/18 9:15 am


Barry Penny PAC [PHYSICIAN ASSISTANT] - 07/06/18 3:00 pm


(Patient may follow-up with Barry Penny PA-C or Dr. Yimi Paez at [facility] in 1 week following discharge.


)


Santiago Beatty MD [Primary Care Provider] - 06/29/18 3:30 pm (please be 

there at 3:15)


Patient Instructions/Handouts:  ORIF of a Leg Fracture (DC)


Activity/Diet/Wound Care/Special Instructions: 


1.  Remain nonweightbearing on the right lower extremity


2.  Keep dressing over the incision sites the right lower extremity clean, dry, 

and intact


3.  Elevate right lower extremity and apply ice for comfort support as needed


4.  May use crutches to aid in ambulation


5.  Do not soak right lower extremity in tub


6.  Take medications as prescribed


Discharge Disposition: HOME SELF-CARE

## 2018-06-28 NOTE — P.PN
Subjective


No overnight events








Objective





- Vital Signs


Vital signs: 


 Vital Signs











Temp  98.6 F   06/28/18 00:42


 


Pulse  95   06/28/18 08:31


 


Resp  16   06/28/18 08:31


 


BP  114/76   06/28/18 08:31


 


Pulse Ox  92 L  06/28/18 08:31








 Intake & Output











 06/27/18 06/28/18 06/28/18





 18:59 06:59 18:59


 


Output Total  550 100


 


Balance  -550 -100


 


Weight 97.522 kg  


 


Output:   


 


  Urine  550 100


 


Other:   


 


  # Voids 3 1 














- Exam


PHYSICAL EXAMINATION: 





GENERAL: The patient is alert and oriented x3, not in any acute distress. Well 

developed, well nourished. 


HEENT: Pupils are round and equally reacting to light. EOMI. No scleral 

icterus. No conjunctival pallor. Normocephalic, atraumatic. No pharyngeal 

erythema. No thyromegaly. 


CARDIOVASCULAR: S1 and S2 present. No murmurs, rubs, or gallops. 


PULMONARY: Chest is clear to auscultation, no wheezing or crackles. 


ABDOMEN: Soft, nontender, nondistended, normoactive bowel sounds. No palpable 

organomegaly. 


MUSCULOSKELETAL: Deferred to orthopedic surgery.


EXTREMITIES: No cyanosis, clubbing, or pedal edema. 


NEUROLOGICAL: Gross neurological examination did not reveal any focal deficits. 


SKIN: No rashes. 











- Labs


CBC & Chem 7: 


 06/28/18 06:26





 06/24/18 15:30


Labs: 


 Abnormal Lab Results - Last 24 Hours (Table)











  06/28/18 Range/Units





  06:26 


 


Hgb  12.8 L  (13.0-17.5)  gm/dL














Assessment and Plan


Plan: 


-Status post right hip (left fracture and status post intramedullary shameka 

placement.  Patient is being discharged from arthritic perspective of DVT 

prophylaxis as per them and pain management as per primary service


-Premature ventricular contractions went into bigeminy patient is being 

discharged on metoprolol


-Elevated TSH secondary to sick euthyroid syndrome repeat TSH as an outpatient 

in about a month.


-Leukocytosis without any signs or symptoms of sepsis secondary to fracture as 

mentioned above.





His discharge medication reconciliation was reviewed patient is okay to be 

discharged from medical perspective

## 2018-07-24 ENCOUNTER — HOSPITAL ENCOUNTER (OUTPATIENT)
Dept: HOSPITAL 47 - LABWHC1 | Age: 58
Discharge: HOME | End: 2018-07-24
Payer: MEDICARE

## 2018-07-24 DIAGNOSIS — I49.3: Primary | ICD-10-CM

## 2018-07-24 LAB
ANION GAP SERPL CALC-SCNC: 8 MMOL/L
BUN SERPL-SCNC: 17 MG/DL (ref 9–20)
CALCIUM SPEC-MCNC: 10 MG/DL (ref 8.4–10.2)
CHLORIDE SERPL-SCNC: 104 MMOL/L (ref 98–107)
CO2 SERPL-SCNC: 29 MMOL/L (ref 22–30)
ERYTHROCYTE [DISTWIDTH] IN BLOOD BY AUTOMATED COUNT: 5.34 M/UL (ref 4.3–5.9)
ERYTHROCYTE [DISTWIDTH] IN BLOOD: 12.9 % (ref 11.5–15.5)
GLUCOSE SERPL-MCNC: 93 MG/DL (ref 74–99)
HCT VFR BLD AUTO: 46.9 % (ref 39–53)
HGB BLD-MCNC: 15.2 GM/DL (ref 13–17.5)
MAGNESIUM SPEC-SCNC: 2.1 MG/DL (ref 1.6–2.3)
MCH RBC QN AUTO: 28.4 PG (ref 25–35)
MCHC RBC AUTO-ENTMCNC: 32.4 G/DL (ref 31–37)
MCV RBC AUTO: 87.8 FL (ref 80–100)
PLATELET # BLD AUTO: 224 K/UL (ref 150–450)
POTASSIUM SERPL-SCNC: 6 MMOL/L (ref 3.5–5.1)
SODIUM SERPL-SCNC: 141 MMOL/L (ref 137–145)
WBC # BLD AUTO: 6.6 K/UL (ref 3.8–10.6)

## 2018-07-24 PROCEDURE — 36415 COLL VENOUS BLD VENIPUNCTURE: CPT

## 2018-07-24 PROCEDURE — 83735 ASSAY OF MAGNESIUM: CPT

## 2018-07-24 PROCEDURE — 84443 ASSAY THYROID STIM HORMONE: CPT

## 2018-07-24 PROCEDURE — 80048 BASIC METABOLIC PNL TOTAL CA: CPT

## 2018-07-24 PROCEDURE — 85027 COMPLETE CBC AUTOMATED: CPT

## 2018-07-31 ENCOUNTER — HOSPITAL ENCOUNTER (OUTPATIENT)
Dept: HOSPITAL 47 - LABWHC1 | Age: 58
Discharge: HOME | End: 2018-07-31
Payer: MEDICARE

## 2018-07-31 DIAGNOSIS — E87.5: Primary | ICD-10-CM

## 2018-07-31 LAB
ANION GAP SERPL CALC-SCNC: 9 MMOL/L
BUN SERPL-SCNC: 13 MG/DL (ref 9–20)
CALCIUM SPEC-MCNC: 10 MG/DL (ref 8.4–10.2)
CHLORIDE SERPL-SCNC: 102 MMOL/L (ref 98–107)
CO2 SERPL-SCNC: 30 MMOL/L (ref 22–30)
GLUCOSE SERPL-MCNC: 96 MG/DL (ref 74–99)
POTASSIUM SERPL-SCNC: 5.4 MMOL/L (ref 3.5–5.1)
SODIUM SERPL-SCNC: 141 MMOL/L (ref 137–145)

## 2018-07-31 PROCEDURE — 80048 BASIC METABOLIC PNL TOTAL CA: CPT

## 2018-07-31 PROCEDURE — 36415 COLL VENOUS BLD VENIPUNCTURE: CPT

## 2018-08-09 ENCOUNTER — HOSPITAL ENCOUNTER (OUTPATIENT)
Dept: HOSPITAL 47 - LABWHC1 | Age: 58
Discharge: HOME | End: 2018-08-09
Payer: MEDICARE

## 2018-08-09 DIAGNOSIS — E87.5: Primary | ICD-10-CM

## 2018-08-09 LAB
ANION GAP SERPL CALC-SCNC: 10 MMOL/L
BUN SERPL-SCNC: 11 MG/DL (ref 9–20)
CALCIUM SPEC-MCNC: 9.6 MG/DL (ref 8.4–10.2)
CHLORIDE SERPL-SCNC: 103 MMOL/L (ref 98–107)
CO2 SERPL-SCNC: 28 MMOL/L (ref 22–30)
GLUCOSE SERPL-MCNC: 98 MG/DL (ref 74–99)
POTASSIUM SERPL-SCNC: 4.3 MMOL/L (ref 3.5–5.1)
SODIUM SERPL-SCNC: 141 MMOL/L (ref 137–145)

## 2018-08-09 PROCEDURE — 36415 COLL VENOUS BLD VENIPUNCTURE: CPT

## 2018-08-09 PROCEDURE — 80048 BASIC METABOLIC PNL TOTAL CA: CPT

## 2018-11-06 ENCOUNTER — HOSPITAL ENCOUNTER (OUTPATIENT)
Dept: HOSPITAL 47 - LABPAT | Age: 58
Discharge: HOME | End: 2018-11-06
Payer: MEDICARE

## 2018-11-06 DIAGNOSIS — Z01.812: Primary | ICD-10-CM

## 2018-11-06 DIAGNOSIS — I49.3: ICD-10-CM

## 2018-11-06 DIAGNOSIS — R07.89: ICD-10-CM

## 2018-11-06 LAB
ANION GAP SERPL CALC-SCNC: 7 MMOL/L
BUN SERPL-SCNC: 12 MG/DL (ref 9–20)
CHLORIDE SERPL-SCNC: 104 MMOL/L (ref 98–107)
CO2 SERPL-SCNC: 29 MMOL/L (ref 22–30)
ERYTHROCYTE [DISTWIDTH] IN BLOOD BY AUTOMATED COUNT: 5.58 M/UL (ref 4.3–5.9)
ERYTHROCYTE [DISTWIDTH] IN BLOOD: 12.9 % (ref 11.5–15.5)
HCT VFR BLD AUTO: 48.8 % (ref 39–53)
HGB BLD-MCNC: 15.9 GM/DL (ref 13–17.5)
MCH RBC QN AUTO: 28.5 PG (ref 25–35)
MCHC RBC AUTO-ENTMCNC: 32.6 G/DL (ref 31–37)
MCV RBC AUTO: 87.5 FL (ref 80–100)
PLATELET # BLD AUTO: 325 K/UL (ref 150–450)
POTASSIUM SERPL-SCNC: 5.3 MMOL/L (ref 3.5–5.1)
SODIUM SERPL-SCNC: 140 MMOL/L (ref 137–145)
WBC # BLD AUTO: 6.8 K/UL (ref 3.8–10.6)

## 2018-11-06 PROCEDURE — 80051 ELECTROLYTE PANEL: CPT

## 2018-11-06 PROCEDURE — 85027 COMPLETE CBC AUTOMATED: CPT

## 2018-11-06 PROCEDURE — 36415 COLL VENOUS BLD VENIPUNCTURE: CPT

## 2018-11-06 PROCEDURE — 84520 ASSAY OF UREA NITROGEN: CPT

## 2018-11-06 PROCEDURE — 82565 ASSAY OF CREATININE: CPT

## 2019-03-20 ENCOUNTER — HOSPITAL ENCOUNTER (OUTPATIENT)
Dept: HOSPITAL 47 - LABWHC1 | Age: 59
Discharge: HOME | End: 2019-03-20
Attending: INTERNAL MEDICINE
Payer: MEDICARE

## 2019-03-20 DIAGNOSIS — I49.3: ICD-10-CM

## 2019-03-20 DIAGNOSIS — I47.2: ICD-10-CM

## 2019-03-20 DIAGNOSIS — Z01.812: Primary | ICD-10-CM

## 2019-03-20 LAB
ANION GAP SERPL CALC-SCNC: 5.9 MMOL/L (ref 4–12)
BUN SERPL-SCNC: 12 MG/DL (ref 9–27)
CHLORIDE SERPL-SCNC: 105 MMOL/L (ref 96–109)
CO2 SERPL-SCNC: 29.1 MMOL/L (ref 21.6–31.8)
ERYTHROCYTE [DISTWIDTH] IN BLOOD BY AUTOMATED COUNT: 5.3 M/UL (ref 4.3–5.9)
ERYTHROCYTE [DISTWIDTH] IN BLOOD: 12.9 % (ref 11.5–15.5)
GLUCOSE SERPL-MCNC: 111 MG/DL (ref 70–110)
HCT VFR BLD AUTO: 47 % (ref 39–53)
HGB BLD-MCNC: 15.2 GM/DL (ref 13–17.5)
MCH RBC QN AUTO: 28.7 PG (ref 25–35)
MCHC RBC AUTO-ENTMCNC: 32.4 G/DL (ref 31–37)
MCV RBC AUTO: 88.7 FL (ref 80–100)
PLATELET # BLD AUTO: 319 K/UL (ref 150–450)
POTASSIUM SERPL-SCNC: 5.3 MMOL/L (ref 3.5–5.5)
SODIUM SERPL-SCNC: 140 MMOL/L (ref 135–145)
WBC # BLD AUTO: 7.8 K/UL (ref 3.8–10.6)

## 2019-03-20 PROCEDURE — 82947 ASSAY GLUCOSE BLOOD QUANT: CPT

## 2019-03-20 PROCEDURE — 80051 ELECTROLYTE PANEL: CPT

## 2019-03-20 PROCEDURE — 82565 ASSAY OF CREATININE: CPT

## 2019-03-20 PROCEDURE — 85027 COMPLETE CBC AUTOMATED: CPT

## 2019-03-20 PROCEDURE — 84520 ASSAY OF UREA NITROGEN: CPT

## 2019-03-20 PROCEDURE — 36415 COLL VENOUS BLD VENIPUNCTURE: CPT

## 2021-02-15 ENCOUNTER — HOSPITAL ENCOUNTER (OUTPATIENT)
Dept: HOSPITAL 47 - RADNMMAIN | Age: 61
Discharge: HOME | End: 2021-02-15
Attending: ORTHOPAEDIC SURGERY
Payer: MEDICARE

## 2021-02-15 DIAGNOSIS — M25.512: ICD-10-CM

## 2021-02-15 DIAGNOSIS — S82.431D: ICD-10-CM

## 2021-02-15 DIAGNOSIS — M79.661: ICD-10-CM

## 2021-02-15 DIAGNOSIS — R22.41: ICD-10-CM

## 2021-02-15 DIAGNOSIS — S82.231D: ICD-10-CM

## 2021-02-15 DIAGNOSIS — R94.8: Primary | ICD-10-CM

## 2021-02-15 PROCEDURE — 78300 BONE IMAGING LIMITED AREA: CPT

## 2021-03-01 ENCOUNTER — HOSPITAL ENCOUNTER (OUTPATIENT)
Dept: HOSPITAL 47 - RADNMMAIN | Age: 61
End: 2021-03-01
Attending: ORTHOPAEDIC SURGERY
Payer: MEDICARE

## 2021-03-01 DIAGNOSIS — M79.661: Primary | ICD-10-CM

## 2021-03-01 PROCEDURE — 78300 BONE IMAGING LIMITED AREA: CPT

## 2021-03-01 NOTE — NM
EXAMINATION TYPE: NM WBC limited

 

DATE OF EXAM: 3/1/2021

 

COMPARISON: Prior bone scan 2/15/2021

 

HISTORY: Pain in right leg

 

TECHNIQUE:  Following administration of 20 mCi Tc99m Ceretec.  Images obtained 4 hours post injection
.

 

FINDINGS:

There is some abnormal increased uptake involving the proximal left leg soft tissues as compared to r
ight. No definite abnormal bone uptake. Limited scanning performed of the legs.

 

 

IMPRESSION:

Findings may be indicative of cellulitis in the left leg as compared to right, correlate.

## 2024-08-02 ENCOUNTER — HOSPITAL ENCOUNTER (OUTPATIENT)
Dept: HOSPITAL 47 - RADCTMAIN | Age: 64
Discharge: HOME | End: 2024-08-02
Attending: INTERNAL MEDICINE
Payer: MEDICARE

## 2024-08-02 DIAGNOSIS — K44.9: ICD-10-CM

## 2024-08-02 DIAGNOSIS — J90: Primary | ICD-10-CM

## 2024-08-02 DIAGNOSIS — R91.8: ICD-10-CM

## 2024-08-02 DIAGNOSIS — J85.0: ICD-10-CM

## 2024-08-02 PROCEDURE — 71260 CT THORAX DX C+: CPT

## 2024-08-02 NOTE — CT
EXAMINATION TYPE: CT chest w con

CT DLP: 641 mGycm, Automated exposure control for dose reduction was used.

 

DATE OF EXAM: 8/2/2024 8:21 AM

 

COMPARISON: Chest radiograph 8/2/2024

 

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
maria teresa and necrosis of lung, hospitalized in July for pneumonia. Pt said he was told there was a "dark 
spot" on his LT side.

 

TECHNIQUE: Multiple axial images were obtained through the chest following the administration of 100 
cc of Isovue 300. . Coronal and sagittal reformats reviewed.

 

FINDINGS: 

LUNGS/ PLEURA: Small left pleural effusion. Fusion is a rounded opacity with heterogenous attenuation
 measuring up to 3.8 cm (series 3, image 42). Additional smaller focus in the medial right lower lobe
 adjacent to the pleural effusion measuring up to 1.9 cm (series 3, image 46). None of these demonstr
ate internal gas. No pneumothorax. Right middle lobe peripheral 2.7 mm pulmonary nodule (series 4, im
age 42). 6 mm nodule along the right major fissure. Represent intrafissural lymph node (series 4, yara
ge 36). Right lower lobe dependent subsegmental atelectasis.

AIRWAY: Patent and unremarkable..

HEART: Mildly prominent in size. No pericardial effusion.

MEDIASTINUM: No gross evidence of adenopathy.

VASCULATURE:  No aortic aneurysm. 

MUSCULOSKELETAL: No acute osseous abnormalities. Partial visualization of anterior cervical fusion ha
rdware.

SOFT TISSUES/LYMPH NODES: Bilateral gynecomastia.

LOWER NECK: No significant findings.

UPPER ABDOMEN: Moderate sized hiatal hernia. 

 

IMPRESSION:

1. Small left pleural effusion with 2 adjacent rounded pulmonary opacities favored to represent round
ed atelectasis however underlying malignancy/infection is not excluded. Follow-up CT chest in 3-6 mon
ths is recommended.

2. Moderate size hiatal hernia.

## 2024-09-02 ENCOUNTER — HOSPITAL ENCOUNTER (EMERGENCY)
Dept: HOSPITAL 47 - EC | Age: 64
Discharge: HOME | End: 2024-09-02
Payer: MEDICARE

## 2024-09-02 VITALS — SYSTOLIC BLOOD PRESSURE: 136 MMHG | DIASTOLIC BLOOD PRESSURE: 84 MMHG | HEART RATE: 74 BPM | RESPIRATION RATE: 18 BRPM

## 2024-09-02 VITALS — TEMPERATURE: 98 F

## 2024-09-02 LAB
ALBUMIN SERPL-MCNC: 4.4 G/DL (ref 3.5–5)
ALP SERPL-CCNC: 67 U/L (ref 38–126)
ALT SERPL-CCNC: 17 U/L (ref 4–49)
ANION GAP SERPL CALC-SCNC: 6 MMOL/L
APTT BLD: 21.6 SEC (ref 22–30)
AST SERPL-CCNC: 24 U/L (ref 17–59)
BASOPHILS # BLD AUTO: 0.1 K/UL (ref 0–0.2)
BASOPHILS NFR BLD AUTO: 2 %
BUN SERPL-SCNC: 11 MG/DL (ref 9–20)
CALCIUM SPEC-MCNC: 9.4 MG/DL (ref 8.4–10.2)
CHLORIDE SERPL-SCNC: 103 MMOL/L (ref 98–107)
CO2 SERPL-SCNC: 28 MMOL/L (ref 22–30)
EOSINOPHIL # BLD AUTO: 0.2 K/UL (ref 0–0.7)
EOSINOPHIL NFR BLD AUTO: 5 %
ERYTHROCYTE [DISTWIDTH] IN BLOOD BY AUTOMATED COUNT: 5.19 M/UL (ref 4.3–5.9)
ERYTHROCYTE [DISTWIDTH] IN BLOOD: 14.6 % (ref 11.5–15.5)
GLUCOSE SERPL-MCNC: 105 MG/DL (ref 74–99)
HCT VFR BLD AUTO: 42.5 % (ref 39–53)
HGB BLD-MCNC: 13.7 GM/DL (ref 13–17.5)
INR PPP: 1 (ref ?–1.2)
LYMPHOCYTES # SPEC AUTO: 1.8 K/UL (ref 1–4.8)
LYMPHOCYTES NFR SPEC AUTO: 38 %
MAGNESIUM SPEC-SCNC: 2.1 MG/DL (ref 1.6–2.3)
MCH RBC QN AUTO: 26.4 PG (ref 25–35)
MCHC RBC AUTO-ENTMCNC: 32.3 G/DL (ref 31–37)
MCV RBC AUTO: 81.8 FL (ref 80–100)
MONOCYTES # BLD AUTO: 0.3 K/UL (ref 0–1)
MONOCYTES NFR BLD AUTO: 6 %
NEUTROPHILS # BLD AUTO: 2.2 K/UL (ref 1.3–7.7)
NEUTROPHILS NFR BLD AUTO: 46 %
PLATELET # BLD AUTO: 322 K/UL (ref 150–450)
POTASSIUM SERPL-SCNC: 4.5 MMOL/L (ref 3.5–5.1)
PROT SERPL-MCNC: 7.3 G/DL (ref 6.3–8.2)
PT BLD: 10.6 SEC (ref 10–12.5)
SODIUM SERPL-SCNC: 137 MMOL/L (ref 137–145)
WBC # BLD AUTO: 4.7 K/UL (ref 3.8–10.6)

## 2024-09-02 PROCEDURE — 85025 COMPLETE CBC W/AUTO DIFF WBC: CPT

## 2024-09-02 PROCEDURE — 71046 X-RAY EXAM CHEST 2 VIEWS: CPT

## 2024-09-02 PROCEDURE — 80053 COMPREHEN METABOLIC PANEL: CPT

## 2024-09-02 PROCEDURE — 99285 EMERGENCY DEPT VISIT HI MDM: CPT

## 2024-09-02 PROCEDURE — 85730 THROMBOPLASTIN TIME PARTIAL: CPT

## 2024-09-02 PROCEDURE — 96360 HYDRATION IV INFUSION INIT: CPT

## 2024-09-02 PROCEDURE — 83735 ASSAY OF MAGNESIUM: CPT

## 2024-09-02 PROCEDURE — 85379 FIBRIN DEGRADATION QUANT: CPT

## 2024-09-02 PROCEDURE — 85610 PROTHROMBIN TIME: CPT

## 2024-09-02 PROCEDURE — 84484 ASSAY OF TROPONIN QUANT: CPT

## 2024-09-02 PROCEDURE — 93005 ELECTROCARDIOGRAM TRACING: CPT

## 2024-09-02 PROCEDURE — 36415 COLL VENOUS BLD VENIPUNCTURE: CPT

## 2024-09-02 RX ADMIN — CEFAZOLIN ONE MLS/HR: 330 INJECTION, POWDER, FOR SOLUTION INTRAMUSCULAR; INTRAVENOUS at 12:12

## 2024-09-02 NOTE — XR
EXAMINATION TYPE: XR chest 2V

 

DATE OF EXAM: 9/2/2024

 

COMPARISON: 7/31/2024

 

HISTORY: Chest pain

 

TECHNIQUE:  Frontal and lateral views of the chest are obtained.

 

FINDINGS:  

 

The pleural effusion seen on the prior study has resolved. 

 

There are thick bandlike opacities in the left lung base which could represent pneumonic infiltrates,
 interstitial scarring or atelectasis.

 

The right lung is clear.

 

The heart and pulmonary vasculature are normal. There is no pneumothorax.

 

The osseous structures are intact.

 

IMPRESSION:

 

Focal opacities in the left lung base described above. This is a nonspecific finding and could repres
ent an acute or chronic process and short-term follow-up is recommended along with clinical correlati
on.

## 2024-09-02 NOTE — ED
General Adult HPI





- General


Chief complaint: Syncope


Stated complaint: syncope


Time Seen by Provider: 09/02/24 11:15


Source: patient, EMS, RN notes reviewed, old records reviewed


Mode of arrival: EMS


Limitations: no limitations





- History of Present Illness


Initial comments: 





This is a 63-year-old male who presents to the emergency department stating that

he was at lunch and he was eating and all of a sudden he had severe pain in his 

left upper incisor and his wife states he almost passed out at that point it 

lasted for few minutes and then it resolved.  According to the patient this 

happened just a few weeks ago when he had to go see his niece in the hospital 

and she had already passed away he passed out completely at that time.  Patient 

states he has been so upset about this over the last few weeks he has not been 

eating or drinking and he is lost about 40 pounds.  Patient denies any chest 

pain palpitations difficulty breathing or shortness of breath.  Patient states 

his only symptom was the pain in the tooth which was quite severe but short-

lived.  Patient denies any symptoms now.





- Related Data


                                Home Medications











 Medication  Instructions  Recorded  Confirmed


 


DULoxetine HCL [Cymbalta] 60 mg PO DAILY 06/24/18 03/26/19


 


Aspirin EC [Ecotrin Low Dose] 81 mg PO DAILY 11/06/18 03/26/19


 


Metoprolol Tartrate 25 mg PO DAILY 03/26/19 03/26/19


 


Metoprolol Tartrate [Lopressor] 12.5 mg PO HS 03/26/19 03/26/19


 


Omeprazole [PriLOSEC] 20 mg PO AC-[id number] 03/26/19 03/26/19











                                    Allergies











Allergy/AdvReac Type Severity Reaction Status Date / Time


 


Penicillins Allergy  Unknown Verified 03/26/19 06:23














Review of Systems


ROS Statement: 


Those systems with pertinent positive or pertinent negative responses have been 

documented in the HPI.





ROS Other: All systems not noted in ROS Statement are negative.





Past Medical History


Past Medical History: Musculoskeletal Disorder


Additional Past Medical History / Comment(s): RSD left arm, tinnititus, 

heartburn.


History of Any Multi-Drug Resistant Organisms: None Reported


Past Surgical History: Orthopedic Surgery


Additional Past Surgical History / Comment(s): Neck fx repair, broken right leg 

repair, right knee cap surgery, esophagus dilated X3.


Past Anesthesia/Blood Transfusion Reactions: Previous Problems w/ Anesthesia


Additional Past Anesthesia/Blood Transfusion Reaction / Comment(s): "Difficult 

to wake up a couple of times."


Past Psychological History: No Psychological Hx Reported


Smoking Status: Never smoker


Past Alcohol Use History: None Reported


Past Drug Use History: None Reported





- Past Family History


  ** Mother


Family Medical History: Unable to Obtain


Additional Family Medical History / Comment(s): Patient states adopted, unaware 

of family history.





General Exam





- General Exam Comments


Initial Comments: 





GENERAL:


Patient is well-developed and well-nourished.  Patient is nontoxic and well-

hydrated and is in no acute distress.





ENT:


Neck is soft and supple.  No significant lymphadenopathy is noted.  Oropharynx 

is clear.  Moist mucous membranes.  Neck has full range of motion without 

eliciting any pain.  





EYES:


The sclera were anicteric and conjunctiva were pink and moist.  Extraocular 

movements were intact and pupils were equal round and reactive to light.  

Eyelids were unremarkable.





PULMONARY:


Unlabored respirations.  Good breath sounds bilaterally.  No audible rales 

rhonchi or wheezing was noted.





CARDIOVASCULAR:


There is a regular rate and rhythm without any murmurs gallops or rubs.  





ABDOMEN:


Soft and nontender with normal bowel sounds.  





SKIN:


Skin is clear with no lesions or rashes and otherwise unremarkable.





NEUROLOGIC:


Patient is alert and oriented x3.  Cranial nerves II through XII are grossly 

intact.  Motor and sensory are also intact.  Normal speech, volume and content. 

 Symmetrical smile. 





MUSCULOSKELETAL:


Normal extremities with adequate strength and full range of motion.  No lower 

extremity swelling or edema.  No calf tenderness.





LYMPHATICS:


No significant lymphadenopathy is noted





PSYCHIATRIC:


Normal psychiatric evaluation.  


Limitations: no limitations





Course





                                   Vital Signs











  09/02/24 09/02/24 09/02/24





  11:14 12:13 13:18


 


Temperature 98 F  


 


Pulse Rate 72  74


 


Pulse Rate [  72 





Sitting Cardiac   





Monitor]   


 


Pulse Rate [  77 





Standing   





Cardiac Monitor   





]   


 


Pulse Rate [  71 





Supine Cardiac   





Monitor]   


 


Respiratory 20  18





Rate   


 


Blood Pressure   136/84


 


Blood Pressure  137/85 





[Left Arm   





Sitting]   


 


Blood Pressure  131/84 





[Left Arm   





Standing]   


 


Blood Pressure  134/83 





[Left Arm   





Supine]   


 


O2 Sat by Pulse 99  97





Oximetry   














Medical Decision Making





- Medical Decision Making





EKG is interpreted by myself.  EKG shows a sinus rhythm at 69 bpm parables 155 

 QT interval 379 QTc is 397.  Patient's EKG shows no ST segment ovation 

or depression.





Was pt. sent in by a medical professional or institution (ROXANNE Boucher, NP, urgent 

care, hospital, or nursing home...) When possible be specific


@ -No


Did you speak to anyone other than the patient for history (EMS, parent, family,

police, friend...)? What history was obtained from this source 


@ -Wife gave the history of the patient having a near syncopal episode


Did you review nursing and triage notes (agree or disagree)? Why? 


@ -I reviewed and agree with nursing and triage notes


Were old charts reviewed (outside hosp., previous admission, EMS record, old 

EKG, old radiological studies, urgent care reports/EKG's, nursing home records)?

Report findings 


@ -No old charts were reviewed


Differential Diagnosis? 


@ -Differential Syncope:


Valvular disease, hypertrophic cardiomyopathy, pulmonary embolism, tamponade, 

tachycardia, bradycardia, MI, hypovolemia, hemorrhage, dissection, anemia, 

intracranial hemorrhage, seizure, hypoglycemia, carbon monoxide poisoning, this 

is not meant to be an all-inclusive list.





EKG interpreted by me (3pts min.).


@ -As above


X-rays interpreted by me (1pt min.).


@ -Chest x-ray showed no acute abnormality


CT interpreted by me (1pt min.).


@ -None done


U/S interpreted by me (1pt. min.).


@ -None done


What testing was considered but not performed or refused? (CT, X-rays, U/S, 

labs)? Why?


@ -None


What meds were considered but not given or refused? Why?


@ -None


Did you discuss the management of the patient with other professionals 

(professionals i.e. ROXANNE Boucher, NP, lab, RT, psych nurse, , , 

teacher, , )? Give summary


@ -No


Was smoking cessation discussed for >3mins.?


@ -No


Was critical care preformed (if so, how long)?


@ -No


Were there social determinants of health that impacted care today? How? 

(Homelessness, low income, unemployed, alcoholism, drug addiction, 

transportation, low edu. Level, literacy, decrease access to med. care, penitentiary, 

rehab)?


@ -No


Was there de-escalation of care discussed even if they declined (Discuss DNR or 

withdrawal of care, Hospice)? DNR status


@ -No


What co-morbidities impacted this encounter? (DM, HTN, Smoking, COPD, CAD, 

Cancer, CVA, ARF, Chemo, Hep., AIDS, mental health diagnosis, sleep apnea, 

morbid obesity)?


@ -None


Was patient admitted / discharged? Hospital course, mention meds given and 

route, prescriptions, significant lab abnormalities, going to OR and other 

pertinent info.


@ -Patient was asymptomatic throughout his course to the emergency department.  

Lab work came back within normal range.  Patient had a chest x-ray showed no 

acute normality.  Patient will follow-up with his PMD


Undiagnosed new problem with uncertain prognosis?


@ -No


Drug Therapy requiring intensive monitoring for toxicity (Heparin, Nitro, 

Insulin, Cardizem)?


@ -No


Were any procedures done?


@ -No


Diagnosis/symptom?


@ -Near syncope


Acute, or Chronic, or Acute on Chronic?


@ -Acute


Uncomplicated (without systemic symptoms) or Complicated (systemic symptoms)?


@ -Complicated


Side effects of treatment?


@ -No


Exacerbation, Progression, or Severe Exacerbation?


@ -No


Poses a threat to life or bodily function? How? (Chest pain, USA, MI, pneumonia,

PE, COPD, DKA, ARF, appy, cholecystitis, CVA, Diverticulitis, Homicidal, S

uicidal, threat to staff... and all critical care pts)


@ -No





- Lab Data


Result diagrams: 


                                 09/02/24 11:53





                                 09/02/24 11:53





                                   Lab Results











  09/02/24 09/02/24 09/02/24 Range/Units





  11:53 11:53 11:53 


 


WBC  4.7    (3.8-10.6)  k/uL


 


RBC  5.19    (4.30-5.90)  m/uL


 


Hgb  13.7    (13.0-17.5)  gm/dL


 


Hct  42.5    (39.0-53.0)  %


 


MCV  81.8    (80.0-100.0)  fL


 


MCH  26.4    (25.0-35.0)  pg


 


MCHC  32.3    (31.0-37.0)  g/dL


 


RDW  14.6    (11.5-15.5)  %


 


Plt Count  322    (150-450)  k/uL


 


MPV  7.1    


 


Neutrophils %  46    %


 


Lymphocytes %  38    %


 


Monocytes %  6    %


 


Eosinophils %  5    %


 


Basophils %  2    %


 


Neutrophils #  2.2    (1.3-7.7)  k/uL


 


Lymphocytes #  1.8    (1.0-4.8)  k/uL


 


Monocytes #  0.3    (0-1.0)  k/uL


 


Eosinophils #  0.2    (0-0.7)  k/uL


 


Basophils #  0.1    (0-0.2)  k/uL


 


PT   10.6   (10.0-12.5)  sec


 


INR   1.0   (<1.2)  


 


APTT   21.6 L   (22.0-30.0)  sec


 


D-Dimer   0.29   (<0.60)  mg/L FEU


 


Sodium    137  (137-145)  mmol/L


 


Potassium    4.5  (3.5-5.1)  mmol/L


 


Chloride    103  ()  mmol/L


 


Carbon Dioxide    28  (22-30)  mmol/L


 


Anion Gap    6  mmol/L


 


BUN    11  (9-20)  mg/dL


 


Creatinine    0.87  (0.66-1.25)  mg/dL


 


Est GFR (CKD-EPI)AfAm    >90  (>60 ml/min/1.73 sqM)  


 


Est GFR (CKD-EPI)NonAf    >90  (>60 ml/min/1.73 sqM)  


 


Glucose    105 H  (74-99)  mg/dL


 


Calcium    9.4  (8.4-10.2)  mg/dL


 


Magnesium    2.1  (1.6-2.3)  mg/dL


 


Total Bilirubin    1.2  (0.2-1.3)  mg/dL


 


AST    24  (17-59)  U/L


 


ALT    17  (4-49)  U/L


 


Alkaline Phosphatase    67  ()  U/L


 


Troponin I     (0.000-0.034)  ng/mL


 


Total Protein    7.3  (6.3-8.2)  g/dL


 


Albumin    4.4  (3.5-5.0)  g/dL














  09/02/24 Range/Units





  11:53 


 


WBC   (3.8-10.6)  k/uL


 


RBC   (4.30-5.90)  m/uL


 


Hgb   (13.0-17.5)  gm/dL


 


Hct   (39.0-53.0)  %


 


MCV   (80.0-100.0)  fL


 


MCH   (25.0-35.0)  pg


 


MCHC   (31.0-37.0)  g/dL


 


RDW   (11.5-15.5)  %


 


Plt Count   (150-450)  k/uL


 


MPV   


 


Neutrophils %   %


 


Lymphocytes %   %


 


Monocytes %   %


 


Eosinophils %   %


 


Basophils %   %


 


Neutrophils #   (1.3-7.7)  k/uL


 


Lymphocytes #   (1.0-4.8)  k/uL


 


Monocytes #   (0-1.0)  k/uL


 


Eosinophils #   (0-0.7)  k/uL


 


Basophils #   (0-0.2)  k/uL


 


PT   (10.0-12.5)  sec


 


INR   (<1.2)  


 


APTT   (22.0-30.0)  sec


 


D-Dimer   (<0.60)  mg/L FEU


 


Sodium   (137-145)  mmol/L


 


Potassium   (3.5-5.1)  mmol/L


 


Chloride   ()  mmol/L


 


Carbon Dioxide   (22-30)  mmol/L


 


Anion Gap   mmol/L


 


BUN   (9-20)  mg/dL


 


Creatinine   (0.66-1.25)  mg/dL


 


Est GFR (CKD-EPI)AfAm   (>60 ml/min/1.73 sqM)  


 


Est GFR (CKD-EPI)NonAf   (>60 ml/min/1.73 sqM)  


 


Glucose   (74-99)  mg/dL


 


Calcium   (8.4-10.2)  mg/dL


 


Magnesium   (1.6-2.3)  mg/dL


 


Total Bilirubin   (0.2-1.3)  mg/dL


 


AST   (17-59)  U/L


 


ALT   (4-49)  U/L


 


Alkaline Phosphatase   ()  U/L


 


Troponin I  <0.012  (0.000-0.034)  ng/mL


 


Total Protein   (6.3-8.2)  g/dL


 


Albumin   (3.5-5.0)  g/dL














Disposition


Clinical Impression: 


 Vasovagal syncope





Disposition: HOME SELF-CARE


Condition: Good


Instructions (If sedation given, give patient instructions):  Hypotension (ED)


Is patient prescribed a controlled substance at d/c from ED?: No


Referrals: 


Nonstaff,Physician [Primary Care Provider] - 1-2 days


Time of Disposition: 13:58

## 2024-11-08 ENCOUNTER — HOSPITAL ENCOUNTER (OUTPATIENT)
Dept: HOSPITAL 47 - RADCTMAIN | Age: 64
Discharge: HOME | End: 2024-11-08
Attending: INTERNAL MEDICINE
Payer: MEDICARE

## 2024-11-08 DIAGNOSIS — N62: ICD-10-CM

## 2024-11-08 DIAGNOSIS — J98.11: ICD-10-CM

## 2024-11-08 DIAGNOSIS — K44.9: ICD-10-CM

## 2024-11-08 DIAGNOSIS — J85.0: Primary | ICD-10-CM

## 2024-11-08 DIAGNOSIS — J90: ICD-10-CM

## 2024-11-08 DIAGNOSIS — R91.8: ICD-10-CM

## 2024-11-08 PROCEDURE — 71260 CT THORAX DX C+: CPT
